# Patient Record
Sex: FEMALE | Race: WHITE | NOT HISPANIC OR LATINO | Employment: FULL TIME | URBAN - METROPOLITAN AREA
[De-identification: names, ages, dates, MRNs, and addresses within clinical notes are randomized per-mention and may not be internally consistent; named-entity substitution may affect disease eponyms.]

---

## 2017-01-19 ENCOUNTER — OFFICE VISIT (OUTPATIENT)
Dept: FAMILY MEDICINE | Facility: CLINIC | Age: 47
End: 2017-01-19
Payer: COMMERCIAL

## 2017-01-19 VITALS
DIASTOLIC BLOOD PRESSURE: 100 MMHG | TEMPERATURE: 99 F | HEART RATE: 90 BPM | SYSTOLIC BLOOD PRESSURE: 190 MMHG | OXYGEN SATURATION: 99 % | BODY MASS INDEX: 26.79 KG/M2 | WEIGHT: 180.88 LBS | HEIGHT: 69 IN

## 2017-01-19 DIAGNOSIS — J06.9 VIRAL URI WITH COUGH: Primary | ICD-10-CM

## 2017-01-19 DIAGNOSIS — R03.0 ELEVATED BLOOD PRESSURE READING: ICD-10-CM

## 2017-01-19 PROCEDURE — 99999 PR PBB SHADOW E&M-EST. PATIENT-LVL III: CPT | Mod: PBBFAC,,, | Performed by: NURSE PRACTITIONER

## 2017-01-19 PROCEDURE — 99214 OFFICE O/P EST MOD 30 MIN: CPT | Mod: S$GLB,,, | Performed by: NURSE PRACTITIONER

## 2017-01-19 PROCEDURE — 1159F MED LIST DOCD IN RCRD: CPT | Mod: S$GLB,,, | Performed by: NURSE PRACTITIONER

## 2017-01-19 NOTE — PATIENT INSTRUCTIONS
Stop OTC decongestants  Mucinex DM as needed for cough  Tylenol/Advil as needed for fever  Drink lots of fluids  Return next week to have nurse recheck blood pressure

## 2017-01-19 NOTE — LETTER
January 19, 2017      Paula Michael   [address] [place] [address]             [facility]  [address] [facility]  [place] [address]  Phone: [phone]  Fax: [phone] Paula Rodriguez    Was treated here on 01/19/2017    May Return to work/school on 1/23/2017                Garrison Hale, FNP-C

## 2017-01-19 NOTE — MR AVS SNAPSHOT
"    [facility]  [address] [place] [address]  Phone: [phone]  Fax: [phone]                  Paula Rodriguez   2017 9:40 AM   Office Visit    Description:  Female : 1970   Provider:  ANDRIY Steiner   Department:  [facility]           Reason for Visit     Fever     Cough           Diagnoses this Visit        Comments    Viral URI with cough    -  Primary     Elevated blood pressure reading                To Do List           Goals (5 Years of Data)     None      [facility]     [facility] Nurse Care Line -  Assistance  Registered nurses in the [facility] provide clinical advisement, health education, appointment booking, and other advisory services.  Call for this free service at [phone].             Medications           Message regarding Medications     Verify the changes and/or additions to your medication regime listed below are the same as discussed with your clinician today.  If any of these changes or additions are incorrect, please notify your healthcare provider.             Verify that the below list of medications is an accurate representation of the medications you are currently taking.  If none reported, the list may be blank. If incorrect, please contact your healthcare provider. Carry this list with you in case of emergency.           Current Medications     escitalopram oxalate (LEXAPRO) 20 MG tablet Take 1 tablet (20 mg total) by mouth once daily.    zolpidem (AMBIEN) 10 mg Tab Take 1 tablet (10 mg total) by mouth nightly as needed.    ciclopirox (PENLAC) 8 % Soln apply topically once daily           Clinical Reference Information           Vital Signs - Last Recorded  Most recent update: 2017  9:31 AM by Nichol Hamilton MA    BP Pulse Temp Ht Wt SpO2    (!) 190/100 90 98.5 °F (36.9 °C) (Oral) 5' 9" (1.753 m) 82.1 kg (180 lb 14.2 oz) 99%    BMI                26.71 kg/m2          Blood " Pressure          Most Recent Value    BP  (!)  190/100      Allergies as of 1/19/2017     No Known Allergies      Immunizations Administered on Date of Encounter - 1/19/2017     None      Instructions    Stop OTC decongestants  Mucinex DM as needed for cough  Tylenol/Advil as needed for fever  Drink lots of fluids  Return next week to have nurse recheck blood pressure

## 2017-01-19 NOTE — PROGRESS NOTES
Subjective:       Patient ID: Paula Rodriguez is a 46 y.o. female.    Chief Complaint: Fever and Cough    HPI Comments: 46-year-old female presents to the clinic today with complaint of fever, night sweats, sinus congestion, and a slight cough since Sunday.  She states her temperature has been 99.9 and 100.  She denies any sore throat, ear pain, wheezing, shortness of breath, abdominal pain, nausea, vomiting, or diarrhea.  She denies any headaches, dizziness, or blurred vision.  She denies any cardiac chest pain, heart palpitations, shortness of breath, or swelling to lower extremities.  She's been taking [place] cold and sinus.  Her blood pressure today is 190/100.  This could be related to the decongestants.  On 11/30/2016 her blood pressure was 121/70.     Past Medical History   Diagnosis Date    Anxiety disorder 9/6/2013    Insomnia 9/6/2013     History reviewed. No pertinent past surgical history.   reports that she has never smoked. She does not have any smokeless tobacco history on file.  Review of Systems   Constitutional: Positive for fever. Negative for chills.   HENT: Positive for congestion. Negative for ear discharge, ear pain, postnasal drip, rhinorrhea, sinus pressure, sneezing and sore throat.    Eyes: Negative for pain, discharge and itching.   Respiratory: Positive for cough. Negative for shortness of breath and wheezing.    Cardiovascular: Negative for chest pain, palpitations and leg swelling.   Gastrointestinal: Negative for abdominal pain, diarrhea, nausea and vomiting.   Musculoskeletal: Negative for gait problem.   Neurological: Negative for dizziness, light-headedness and headaches.       Objective:      Physical Exam   Constitutional: She is oriented to person, place, and time. She appears well-developed and well-nourished. No distress.   HENT:   Head: Normocephalic and atraumatic.   Right Ear: External ear normal.   Left Ear: External ear normal.   Nose: Nose normal.   Mouth/Throat:  Oropharynx is clear and moist. No oropharyngeal exudate.   Eyes: Conjunctivae and EOM are normal. Pupils are equal, round, and reactive to light. Right eye exhibits no discharge. Left eye exhibits no discharge. No scleral icterus.   Neck: Normal range of motion. Neck supple.   Cardiovascular: Normal rate, regular rhythm and normal heart sounds.  Exam reveals no gallop and no friction rub.    No murmur heard.  Pulmonary/Chest: Effort normal and breath sounds normal. No respiratory distress. She has no wheezes. She has no rales.   Abdominal: Soft. Bowel sounds are normal. There is no tenderness. There is no rebound and no guarding.   Musculoskeletal: Normal range of motion. She exhibits no edema.   Lymphadenopathy:     She has no cervical adenopathy.   Neurological: She is alert and oriented to person, place, and time.   Skin: Skin is warm and dry. She is not diaphoretic.   Psychiatric: She has a normal mood and affect.       Assessment:       1. Viral URI with cough    2. Elevated blood pressure reading        Plan:         Viral URI with cough  - Mucinex DM as directed    Elevated blood pressure reading  - stop Decongestants  - return next week for a blood pressure check up with the nurse     Tylenol/Advil as needed for fever  Rest   Drink lots of fluids

## 2017-01-25 ENCOUNTER — TELEPHONE (OUTPATIENT)
Dept: FAMILY MEDICINE | Facility: CLINIC | Age: 47
End: 2017-01-25

## 2017-01-25 NOTE — TELEPHONE ENCOUNTER
Call and recommend that she continue to monitor at home and then report how is doing to me over the computer

## 2017-01-25 NOTE — TELEPHONE ENCOUNTER
Patient walked in for blood pressure check  160/90. She is not on any medication for blood pressure. The home reading were not significant to warrant concern. Ranges for one week were 132/90  Never going above a high ranges. She also states she was taking a lot of over the counter medications for a cold.She states her blood pressure is usually high in the doctors office. Please advise.

## 2017-08-10 ENCOUNTER — OFFICE VISIT (OUTPATIENT)
Dept: FAMILY MEDICINE | Facility: CLINIC | Age: 47
End: 2017-08-10
Payer: COMMERCIAL

## 2017-08-10 ENCOUNTER — LAB VISIT (OUTPATIENT)
Dept: LAB | Facility: HOSPITAL | Age: 47
End: 2017-08-10
Attending: INTERNAL MEDICINE
Payer: COMMERCIAL

## 2017-08-10 VITALS
HEIGHT: 69 IN | TEMPERATURE: 98 F | BODY MASS INDEX: 25.83 KG/M2 | HEART RATE: 95 BPM | SYSTOLIC BLOOD PRESSURE: 162 MMHG | DIASTOLIC BLOOD PRESSURE: 100 MMHG | OXYGEN SATURATION: 99 % | WEIGHT: 174.38 LBS

## 2017-08-10 DIAGNOSIS — F41.9 ANXIETY DISORDER, UNSPECIFIED TYPE: ICD-10-CM

## 2017-08-10 DIAGNOSIS — Z00.00 ROUTINE MEDICAL EXAM: ICD-10-CM

## 2017-08-10 DIAGNOSIS — Z23 NEED FOR TDAP VACCINATION: ICD-10-CM

## 2017-08-10 DIAGNOSIS — Z00.00 ROUTINE MEDICAL EXAM: Primary | ICD-10-CM

## 2017-08-10 DIAGNOSIS — F41.9 ANXIETY: ICD-10-CM

## 2017-08-10 DIAGNOSIS — I10 ESSENTIAL HYPERTENSION: Primary | ICD-10-CM

## 2017-08-10 DIAGNOSIS — G47.00 INSOMNIA, UNSPECIFIED TYPE: ICD-10-CM

## 2017-08-10 DIAGNOSIS — I10 ESSENTIAL HYPERTENSION: ICD-10-CM

## 2017-08-10 LAB
25(OH)D3+25(OH)D2 SERPL-MCNC: 32 NG/ML
ALBUMIN SERPL BCP-MCNC: 4.3 G/DL
ALP SERPL-CCNC: 65 U/L
ALT SERPL W/O P-5'-P-CCNC: 31 U/L
ANION GAP SERPL CALC-SCNC: 11 MMOL/L
AST SERPL-CCNC: 29 U/L
BASOPHILS # BLD AUTO: 0.02 K/UL
BASOPHILS NFR BLD: 0.4 %
BILIRUB SERPL-MCNC: 0.3 MG/DL
BUN SERPL-MCNC: 13 MG/DL
CALCIUM SERPL-MCNC: 9.3 MG/DL
CHLORIDE SERPL-SCNC: 105 MMOL/L
CHOLEST/HDLC SERPL: 2.6 {RATIO}
CO2 SERPL-SCNC: 26 MMOL/L
CREAT SERPL-MCNC: 0.9 MG/DL
DIFFERENTIAL METHOD: ABNORMAL
EOSINOPHIL # BLD AUTO: 0.1 K/UL
EOSINOPHIL NFR BLD: 1.5 %
ERYTHROCYTE [DISTWIDTH] IN BLOOD BY AUTOMATED COUNT: 11.8 %
EST. GFR  (AFRICAN AMERICAN): >60 ML/MIN/1.73 M^2
EST. GFR  (NON AFRICAN AMERICAN): >60 ML/MIN/1.73 M^2
GLUCOSE SERPL-MCNC: 90 MG/DL
HCT VFR BLD AUTO: 41.8 %
HDL/CHOLESTEROL RATIO: 38.2 %
HDLC SERPL-MCNC: 191 MG/DL
HDLC SERPL-MCNC: 73 MG/DL
HGB BLD-MCNC: 14.3 G/DL
LDLC SERPL CALC-MCNC: 96.4 MG/DL
LYMPHOCYTES # BLD AUTO: 1.3 K/UL
LYMPHOCYTES NFR BLD: 24.9 %
MCH RBC QN AUTO: 31.9 PG
MCHC RBC AUTO-ENTMCNC: 34.2 G/DL
MCV RBC AUTO: 93 FL
MONOCYTES # BLD AUTO: 0.4 K/UL
MONOCYTES NFR BLD: 8 %
NEUTROPHILS # BLD AUTO: 3.5 K/UL
NEUTROPHILS NFR BLD: 65 %
NONHDLC SERPL-MCNC: 118 MG/DL
PLATELET # BLD AUTO: 237 K/UL
PMV BLD AUTO: 10.3 FL
POTASSIUM SERPL-SCNC: 4.3 MMOL/L
PROT SERPL-MCNC: 7.5 G/DL
RBC # BLD AUTO: 4.48 M/UL
SODIUM SERPL-SCNC: 142 MMOL/L
TRIGL SERPL-MCNC: 108 MG/DL
TSH SERPL DL<=0.005 MIU/L-ACNC: 1.87 UIU/ML
WBC # BLD AUTO: 5.38 K/UL

## 2017-08-10 PROCEDURE — 99396 PREV VISIT EST AGE 40-64: CPT | Mod: S$GLB,,, | Performed by: INTERNAL MEDICINE

## 2017-08-10 PROCEDURE — 85025 COMPLETE CBC W/AUTO DIFF WBC: CPT

## 2017-08-10 PROCEDURE — 84443 ASSAY THYROID STIM HORMONE: CPT

## 2017-08-10 PROCEDURE — 80053 COMPREHEN METABOLIC PANEL: CPT

## 2017-08-10 PROCEDURE — 36415 COLL VENOUS BLD VENIPUNCTURE: CPT | Mod: PO

## 2017-08-10 PROCEDURE — 82306 VITAMIN D 25 HYDROXY: CPT

## 2017-08-10 PROCEDURE — 80061 LIPID PANEL: CPT

## 2017-08-10 PROCEDURE — 99999 PR PBB SHADOW E&M-EST. PATIENT-LVL III: CPT | Mod: PBBFAC,,, | Performed by: INTERNAL MEDICINE

## 2017-08-10 RX ORDER — ZOLPIDEM TARTRATE 10 MG/1
10 TABLET ORAL NIGHTLY PRN
Qty: 30 TABLET | Refills: 5 | Status: SHIPPED | OUTPATIENT
Start: 2017-08-10 | End: 2018-08-02 | Stop reason: SDUPTHER

## 2017-08-10 RX ORDER — HYDROCHLOROTHIAZIDE 12.5 MG/1
12.5 TABLET ORAL DAILY
Qty: 90 TABLET | Refills: 11 | Status: SHIPPED | OUTPATIENT
Start: 2017-08-10 | End: 2017-08-11 | Stop reason: SDUPTHER

## 2017-08-10 NOTE — PROGRESS NOTES
Chief complaint physical exam    47-year-old white female still active and follows up with her GYN.  Last mammogram and GYN check was .  Blood pressures generally been running high.  She is exercising less.  She gained a little bit of weight.  Her job is more set area.  We discussed establishing a diagnosis of hypertension at this point.  We discussed low-dose HCTZ and expectations as well as dose adjustments and monitoring as well as the potential for hypotension when working out in the heat.  She would like a tetanus booster since she does work in the yard and also occasionally gets scratches and so forth associated with pets    CONST: weight stable. EYES: no vision change. ENT: no sore throat. CV: no chest pain w/ exertion. RESP: no shortness of breath. GI: no nausea, vomiting, diarrhea. No dysphagia. : no urinary issues. MUSCULOSKELETAL: no new myalgias or arthralgias. SKIN: no new changes. NEURO: no focal deficits. PSYCH: no new issues. ENDOCRINE: no polyuria. HEME: no lymph nodes. ALLERGY: no general pruritis.     PAST MEDICAL HISTORY:    1. Anxiety -Lexapro  2.  Hypertension formally diagnosed   3. [facility]   4.  Nail dermatophytosis                                                                                                                           PAST SURGICAL HISTORY:  None.                                                                                                                             SOCIAL HISTORY:  . Former smoker.  Quit in  and smoked for 10 years        prior.  She drinks alcohol on the weekends.                                                                                                               FAMILY HISTORY:  Mom  with no problems.  Father  last year at 76    of lung cancer and was a nonsmoker but was a book keeper in [place] [facility].    One sister in 40s in good health.        Gen: no distress  EYES: conjunctiva clear,  "non-icteric, PERRL  ENT: nose clear, nasal mucosa normal, oropharynx clear and moist, teeth good  NECK:supple, thyroid non-palpable  RESP: effort is good, lungs clear  CV: heart RRR w/o murmur, gallops or rubs; no carotid bruits, no edema  GI: abdomen soft, non-distended, non-tender, no hepatosplenomegaly  MS: gait normal, no clubbing or cyanosis of the digits  SKIN: no rashes, warm to touch            Paula was seen today for annual exam.    Diagnoses and all orders for this visit:    Routine medical exam, colonoscopy after 50, keep follow-up with GYN, routine lab work  -     Comprehensive metabolic panel; Future  -     CBC auto differential; Future  -     Lipid panel; Future  -     TSH; Future  -     Vitamin D; Future    Essential hypertension, new diagnosis, begin low-dose HCTZ 12.5    Anxiety disorder, unspecified type, continue Lexapro    Insomnia, unspecified type, occasional use of Ambien appropriate    Other orders  -     DIPH,PERTUSS(ACEL),TET VAC(PF)(ADULT)(ADACEL)(TDaP); Inject 0.5 mLs into the muscle once.  -     hydrochlorothiazide (HYDRODIURIL) 12.5 MG Tab; Take 1 tablet (12.5 mg total) by mouth once daily.  -     zolpidem (AMBIEN) 10 mg Tab; Take 1 tablet (10 mg total) by mouth nightly as needed.     Based on underlying anxiety issues, access would not be therapeutic"This note will not be shared with the patient."  "

## 2017-08-11 RX ORDER — ESCITALOPRAM OXALATE 20 MG/1
TABLET ORAL
Qty: 90 TABLET | Refills: 3 | Status: SHIPPED | OUTPATIENT
Start: 2017-08-11 | End: 2018-08-02 | Stop reason: SDUPTHER

## 2017-08-11 RX ORDER — HYDROCHLOROTHIAZIDE 12.5 MG/1
12.5 TABLET ORAL DAILY
Qty: 90 TABLET | Refills: 3 | Status: SHIPPED | OUTPATIENT
Start: 2017-08-11 | End: 2018-08-02 | Stop reason: SDUPTHER

## 2018-08-02 ENCOUNTER — OFFICE VISIT (OUTPATIENT)
Dept: FAMILY MEDICINE | Facility: CLINIC | Age: 48
End: 2018-08-02
Payer: COMMERCIAL

## 2018-08-02 ENCOUNTER — LAB VISIT (OUTPATIENT)
Dept: LAB | Facility: HOSPITAL | Age: 48
End: 2018-08-02
Payer: COMMERCIAL

## 2018-08-02 VITALS
OXYGEN SATURATION: 98 % | HEART RATE: 100 BPM | BODY MASS INDEX: 26.68 KG/M2 | WEIGHT: 180.13 LBS | DIASTOLIC BLOOD PRESSURE: 100 MMHG | HEIGHT: 69 IN | SYSTOLIC BLOOD PRESSURE: 142 MMHG | TEMPERATURE: 99 F

## 2018-08-02 DIAGNOSIS — Z00.00 ROUTINE MEDICAL EXAM: ICD-10-CM

## 2018-08-02 DIAGNOSIS — F41.9 ANXIETY: ICD-10-CM

## 2018-08-02 DIAGNOSIS — Z00.00 ROUTINE MEDICAL EXAM: Primary | ICD-10-CM

## 2018-08-02 DIAGNOSIS — I10 ESSENTIAL HYPERTENSION: ICD-10-CM

## 2018-08-02 LAB
ALBUMIN SERPL BCP-MCNC: 4.4 G/DL
ALP SERPL-CCNC: 67 U/L
ALT SERPL W/O P-5'-P-CCNC: 65 U/L
ANION GAP SERPL CALC-SCNC: 9 MMOL/L
AST SERPL-CCNC: 50 U/L
BASOPHILS # BLD AUTO: 0.04 K/UL
BASOPHILS NFR BLD: 0.8 %
BILIRUB SERPL-MCNC: 0.5 MG/DL
BUN SERPL-MCNC: 13 MG/DL
CALCIUM SERPL-MCNC: 9.8 MG/DL
CHLORIDE SERPL-SCNC: 103 MMOL/L
CHOLEST SERPL-MCNC: 202 MG/DL
CHOLEST/HDLC SERPL: 2.2 {RATIO}
CO2 SERPL-SCNC: 26 MMOL/L
CREAT SERPL-MCNC: 0.8 MG/DL
DIFFERENTIAL METHOD: ABNORMAL
EOSINOPHIL # BLD AUTO: 0.1 K/UL
EOSINOPHIL NFR BLD: 0.9 %
ERYTHROCYTE [DISTWIDTH] IN BLOOD BY AUTOMATED COUNT: 11.7 %
EST. GFR  (AFRICAN AMERICAN): >60 ML/MIN/1.73 M^2
EST. GFR  (NON AFRICAN AMERICAN): >60 ML/MIN/1.73 M^2
ESTIMATED AVG GLUCOSE: 88 MG/DL
GLUCOSE SERPL-MCNC: 100 MG/DL
HBA1C MFR BLD HPLC: 4.7 %
HCT VFR BLD AUTO: 44.1 %
HDLC SERPL-MCNC: 91 MG/DL
HDLC SERPL: 45 %
HGB BLD-MCNC: 14.5 G/DL
IMM GRANULOCYTES # BLD AUTO: 0.01 K/UL
IMM GRANULOCYTES NFR BLD AUTO: 0.2 %
LDLC SERPL CALC-MCNC: 101 MG/DL
LYMPHOCYTES # BLD AUTO: 1.3 K/UL
LYMPHOCYTES NFR BLD: 25 %
MCH RBC QN AUTO: 31.6 PG
MCHC RBC AUTO-ENTMCNC: 32.9 G/DL
MCV RBC AUTO: 96 FL
MONOCYTES # BLD AUTO: 0.4 K/UL
MONOCYTES NFR BLD: 7.7 %
NEUTROPHILS # BLD AUTO: 3.5 K/UL
NEUTROPHILS NFR BLD: 65.4 %
NONHDLC SERPL-MCNC: 111 MG/DL
NRBC BLD-RTO: 0 /100 WBC
PLATELET # BLD AUTO: 266 K/UL
PMV BLD AUTO: 10.6 FL
POTASSIUM SERPL-SCNC: 4.1 MMOL/L
PROT SERPL-MCNC: 7.8 G/DL
RBC # BLD AUTO: 4.59 M/UL
SODIUM SERPL-SCNC: 138 MMOL/L
TRIGL SERPL-MCNC: 50 MG/DL
TSH SERPL DL<=0.005 MIU/L-ACNC: 1.66 UIU/ML
WBC # BLD AUTO: 5.33 K/UL

## 2018-08-02 PROCEDURE — 83036 HEMOGLOBIN GLYCOSYLATED A1C: CPT

## 2018-08-02 PROCEDURE — 3080F DIAST BP >= 90 MM HG: CPT | Mod: CPTII,S$GLB,, | Performed by: NURSE PRACTITIONER

## 2018-08-02 PROCEDURE — 99999 PR PBB SHADOW E&M-EST. PATIENT-LVL III: CPT | Mod: PBBFAC,,, | Performed by: NURSE PRACTITIONER

## 2018-08-02 PROCEDURE — 36415 COLL VENOUS BLD VENIPUNCTURE: CPT | Mod: PO

## 2018-08-02 PROCEDURE — 85025 COMPLETE CBC W/AUTO DIFF WBC: CPT

## 2018-08-02 PROCEDURE — 3008F BODY MASS INDEX DOCD: CPT | Mod: CPTII,S$GLB,, | Performed by: NURSE PRACTITIONER

## 2018-08-02 PROCEDURE — 3077F SYST BP >= 140 MM HG: CPT | Mod: CPTII,S$GLB,, | Performed by: NURSE PRACTITIONER

## 2018-08-02 PROCEDURE — 80061 LIPID PANEL: CPT

## 2018-08-02 PROCEDURE — 99214 OFFICE O/P EST MOD 30 MIN: CPT | Mod: S$GLB,,, | Performed by: NURSE PRACTITIONER

## 2018-08-02 PROCEDURE — 84443 ASSAY THYROID STIM HORMONE: CPT

## 2018-08-02 PROCEDURE — 80053 COMPREHEN METABOLIC PANEL: CPT

## 2018-08-02 RX ORDER — ESCITALOPRAM OXALATE 20 MG/1
20 TABLET ORAL DAILY
Qty: 90 TABLET | Refills: 3 | Status: SHIPPED | OUTPATIENT
Start: 2018-08-02 | End: 2019-08-05 | Stop reason: SDUPTHER

## 2018-08-02 RX ORDER — HYDROCHLOROTHIAZIDE 12.5 MG/1
12.5 TABLET ORAL DAILY
Qty: 90 TABLET | Refills: 3 | Status: SHIPPED | OUTPATIENT
Start: 2018-08-02 | End: 2019-08-05 | Stop reason: SDUPTHER

## 2018-08-02 RX ORDER — ZOLPIDEM TARTRATE 10 MG/1
10 TABLET ORAL NIGHTLY PRN
Qty: 30 TABLET | Refills: 5 | Status: SHIPPED | OUTPATIENT
Start: 2018-08-02 | End: 2019-01-24 | Stop reason: SDUPTHER

## 2018-08-02 RX ORDER — LISINOPRIL 20 MG/1
20 TABLET ORAL DAILY
Qty: 90 TABLET | Refills: 3 | Status: SHIPPED | OUTPATIENT
Start: 2018-08-02 | End: 2019-07-24 | Stop reason: SDUPTHER

## 2018-08-02 NOTE — PROGRESS NOTES
This dictation has been generated using Dragon Dictation some phonetic errors may occur.     Problem List Items Addressed This Visit     Essential hypertension    Relevant Medications    hydroCHLOROthiazide (HYDRODIURIL) 12.5 MG Tab      Other Visit Diagnoses     Routine medical exam    -  Primary    Relevant Orders    CBC auto differential    Comprehensive metabolic panel    Hemoglobin A1c    Lipid panel    Urinalysis    TSH    Anxiety        Relevant Medications    escitalopram oxalate (LEXAPRO) 20 MG tablet        Orders Placed This Encounter    CBC auto differential    Comprehensive metabolic panel    Hemoglobin A1c    Lipid panel    Urinalysis    TSH    escitalopram oxalate (LEXAPRO) 20 MG tablet    zolpidem (AMBIEN) 10 mg Tab    lisinopril (PRINIVIL,ZESTRIL) 20 MG tablet    hydroCHLOROthiazide (HYDRODIURIL) 12.5 MG Tab     Routine medical examination update labs as above.  I will review and address accordingly.  Hypertension stable and controlled continue current therapy  Anxiety stable and controlled continue current therapy.    Follow-up if symptoms worsen or fail to improve.    ________________________________________________________________  ________________________________________________________________      Chief Complaint   Patient presents with    Hypertension    Medication Refill     History of present illness  This 48 y.o. presents today for complaint of physical exam with complaint of elevated blood pressure.  Patient remains active.  She does work in the [employer].  She goes to the gym 2 days a week with her .  She is new to me but follows [employer].  Her  is known to me.  She does watch her diet.  She watches her sodium intake.  She does not drink sodas and does not eat out often.  She does note some recent elevations of her blood pressure.  She follows at [facility] and is overdue for exam last date being about a year and half ago.  Mammogram will be due.  She  states intend to follow up with [facility].  She states she gets her flu shot at work.  She works for the [employer] [place].  Plans to get her shot at work and about October.  ROS:   CONST: weight stable.  EYES: no vision change.  ENT: No sore throat, headache, or earache.  CV: no chest pain   RESP: No shortness of breath.  GI: no nausea, vomiting, diarrhea.   Appetite good.   : no urinary issues excellent going to the bathroom once at night.  MUSCULOSKELETAL: no new myalgias or arthralgias.  SKIN: no new changes.  NEURO: no focal deficits. Denies headache.  PSYCH: no new issues.  ENDOCRINE: no polyuria.  No polydipsia.  HEME: no lymph nodes.  ALLERGY: no general pruritis.       Past Medical History:   Diagnosis Date    Anxiety disorder 9/6/2013    Insomnia 9/6/2013       History reviewed. No pertinent surgical history.    Family History   Problem Relation Age of Onset    Ovarian cancer Maternal Aunt     Breast cancer Neg Hx     Colon cancer Neg Hx        Social History     Social History    Marital status:      Spouse name: N/A    Number of children: N/A    Years of education: N/A     Social History Main Topics    Smoking status: Never Smoker    Smokeless tobacco: None    Alcohol use None    Drug use: Unknown    Sexual activity: Not Asked     Other Topics Concern    None     Social History Narrative    None       Current Outpatient Prescriptions   Medication Sig Dispense Refill    escitalopram oxalate (LEXAPRO) 20 MG tablet Take 1 tablet (20 mg total) by mouth once daily. 90 tablet 3    hydroCHLOROthiazide (HYDRODIURIL) 12.5 MG Tab Take 1 tablet (12.5 mg total) by mouth once daily. 90 tablet 3    zolpidem (AMBIEN) 10 mg Tab Take 1 tablet (10 mg total) by mouth nightly as needed. 30 tablet 5    lisinopril (PRINIVIL,ZESTRIL) 20 MG tablet Take 1 tablet (20 mg total) by mouth once daily. 90 tablet 3     No current facility-administered medications for this visit.         Review of patient's allergies indicates:  No Known Allergies    Physical examination  Vitals Reviewed  Gen. Well-dressed well-nourished   Skin warm dry and intact.  No rashes noted.  Neck is supple without adenopathy  Chest.  Respirations are even unlabored.  Lungs are clear to auscultation.  Cardiac regular rate and rhythm.  No chest wall adenopathy noted.  Abdomen is soft and not distended.  Bowel sounds are present.  No tenderness during palpation of the abdomen.  No Hepatosplenomegaly noted.  No hernia noted.  No CVA tenderness to percussion.    Neuro. Awake alert oriented x4.  Normal judgment and cognition noted.  Extremities no clubbing cyanosis or edema noted.     Call or return to clinic prn if these symptoms worsen or fail to improve as anticipated.

## 2018-08-03 ENCOUNTER — PATIENT MESSAGE (OUTPATIENT)
Dept: FAMILY MEDICINE | Facility: CLINIC | Age: 48
End: 2018-08-03

## 2018-08-03 DIAGNOSIS — R79.89 ELEVATED LFTS: Primary | ICD-10-CM

## 2018-08-17 ENCOUNTER — LAB VISIT (OUTPATIENT)
Dept: LAB | Facility: HOSPITAL | Age: 48
End: 2018-08-17
Attending: NURSE PRACTITIONER
Payer: COMMERCIAL

## 2018-08-17 ENCOUNTER — PATIENT MESSAGE (OUTPATIENT)
Dept: FAMILY MEDICINE | Facility: CLINIC | Age: 48
End: 2018-08-17

## 2018-08-17 ENCOUNTER — CLINICAL SUPPORT (OUTPATIENT)
Dept: FAMILY MEDICINE | Facility: CLINIC | Age: 48
End: 2018-08-17
Payer: COMMERCIAL

## 2018-08-17 VITALS — DIASTOLIC BLOOD PRESSURE: 88 MMHG | SYSTOLIC BLOOD PRESSURE: 140 MMHG | OXYGEN SATURATION: 99 % | HEART RATE: 81 BPM

## 2018-08-17 DIAGNOSIS — R79.89 ELEVATED LFTS: Primary | ICD-10-CM

## 2018-08-17 DIAGNOSIS — R79.89 ELEVATED LFTS: ICD-10-CM

## 2018-08-17 DIAGNOSIS — I10 ESSENTIAL HYPERTENSION: Primary | ICD-10-CM

## 2018-08-17 LAB
ALBUMIN SERPL BCP-MCNC: 4.4 G/DL
ALP SERPL-CCNC: 65 U/L
ALT SERPL W/O P-5'-P-CCNC: 92 U/L
AST SERPL-CCNC: 45 U/L
BILIRUB DIRECT SERPL-MCNC: 0.3 MG/DL
BILIRUB SERPL-MCNC: 0.7 MG/DL
PROT SERPL-MCNC: 7.3 G/DL

## 2018-08-17 PROCEDURE — 99499 UNLISTED E&M SERVICE: CPT | Mod: S$GLB,,, | Performed by: INTERNAL MEDICINE

## 2018-08-17 PROCEDURE — 36415 COLL VENOUS BLD VENIPUNCTURE: CPT | Mod: PO

## 2018-08-17 PROCEDURE — 99999 PR PBB SHADOW E&M-EST. PATIENT-LVL II: CPT | Mod: PBBFAC,,,

## 2018-08-17 PROCEDURE — 80076 HEPATIC FUNCTION PANEL: CPT

## 2018-08-17 NOTE — PROGRESS NOTES
Paula Rodriguez 48 y.o. female is here today for Blood Pressure check.   History of HTN yes.    Review of patient's allergies indicates:  No Known Allergies  Creatinine   Date Value Ref Range Status   08/02/2018 0.8 0.5 - 1.4 mg/dL Final     Sodium   Date Value Ref Range Status   08/02/2018 138 136 - 145 mmol/L Final     Potassium   Date Value Ref Range Status   08/02/2018 4.1 3.5 - 5.1 mmol/L Final   ]  Patient verifies taking blood pressure medications on a regular basis at the same time of the day.     Current Outpatient Medications:     escitalopram oxalate (LEXAPRO) 20 MG tablet, Take 1 tablet (20 mg total) by mouth once daily., Disp: 90 tablet, Rfl: 3    hydroCHLOROthiazide (HYDRODIURIL) 12.5 MG Tab, Take 1 tablet (12.5 mg total) by mouth once daily., Disp: 90 tablet, Rfl: 3    lisinopril (PRINIVIL,ZESTRIL) 20 MG tablet, Take 1 tablet (20 mg total) by mouth once daily., Disp: 90 tablet, Rfl: 3    zolpidem (AMBIEN) 10 mg Tab, Take 1 tablet (10 mg total) by mouth nightly as needed., Disp: 30 tablet, Rfl: 5  Does patient have record of home blood pressure readings yes.  Reports home readings as 120's/70's  Last dose of blood pressure medication was taken at approx 8 am yesterday. Patient did not take medication this morning.   Patient is asymptomatic.   Complains of none.    Vitals:    08/17/18 0857   BP: (!) 140/88   BP Location: Left arm   Patient Position: Sitting   BP Method: Medium (Manual)   Pulse: 81   SpO2: 99%         Dr. Matos notified.

## 2018-08-19 ENCOUNTER — PATIENT MESSAGE (OUTPATIENT)
Dept: FAMILY MEDICINE | Facility: CLINIC | Age: 48
End: 2018-08-19

## 2018-08-19 DIAGNOSIS — R79.89 ELEVATED LFTS: Primary | ICD-10-CM

## 2018-09-10 ENCOUNTER — HOSPITAL ENCOUNTER (OUTPATIENT)
Dept: RADIOLOGY | Facility: HOSPITAL | Age: 48
Discharge: HOME | End: 2018-09-10
Attending: NURSE PRACTITIONER
Payer: COMMERCIAL

## 2018-09-10 DIAGNOSIS — R79.89 ELEVATED LFTS: ICD-10-CM

## 2018-09-10 PROCEDURE — 76700 US EXAM ABDOM COMPLETE: CPT | Mod: TC

## 2018-09-10 PROCEDURE — 76700 US EXAM ABDOM COMPLETE: CPT | Mod: 26,,, | Performed by: RADIOLOGY

## 2018-11-27 ENCOUNTER — PATIENT MESSAGE (OUTPATIENT)
Dept: FAMILY MEDICINE | Facility: CLINIC | Age: 48
End: 2018-11-27

## 2018-11-28 NOTE — TELEPHONE ENCOUNTER
Yes, please call for prior authorization.  This 1 probably should go through and I believe we have done it before

## 2019-01-24 ENCOUNTER — PATIENT MESSAGE (OUTPATIENT)
Dept: FAMILY MEDICINE | Facility: CLINIC | Age: 49
End: 2019-01-24

## 2019-01-25 ENCOUNTER — PATIENT MESSAGE (OUTPATIENT)
Dept: FAMILY MEDICINE | Facility: CLINIC | Age: 49
End: 2019-01-25

## 2019-01-25 RX ORDER — ZOLPIDEM TARTRATE 10 MG/1
10 TABLET ORAL NIGHTLY PRN
Qty: 30 TABLET | Refills: 5 | Status: SHIPPED | OUTPATIENT
Start: 2019-01-25 | End: 2019-08-05 | Stop reason: SDUPTHER

## 2019-01-31 ENCOUNTER — PATIENT MESSAGE (OUTPATIENT)
Dept: FAMILY MEDICINE | Facility: CLINIC | Age: 49
End: 2019-01-31

## 2019-05-15 ENCOUNTER — TELEPHONE (OUTPATIENT)
Dept: ADMINISTRATIVE | Facility: HOSPITAL | Age: 49
End: 2019-05-15

## 2019-05-17 DIAGNOSIS — Z12.11 COLON CANCER SCREENING: ICD-10-CM

## 2019-06-18 ENCOUNTER — PATIENT MESSAGE (OUTPATIENT)
Dept: ADMINISTRATIVE | Facility: HOSPITAL | Age: 49
End: 2019-06-18

## 2019-06-18 ENCOUNTER — DOCUMENTATION ONLY (OUTPATIENT)
Dept: ADMINISTRATIVE | Facility: HOSPITAL | Age: 49
End: 2019-06-18

## 2019-07-16 ENCOUNTER — LAB VISIT (OUTPATIENT)
Dept: LAB | Facility: HOSPITAL | Age: 49
End: 2019-07-16
Attending: INTERNAL MEDICINE
Payer: COMMERCIAL

## 2019-07-16 DIAGNOSIS — Z12.11 COLON CANCER SCREENING: ICD-10-CM

## 2019-07-16 PROCEDURE — 82274 ASSAY TEST FOR BLOOD FECAL: CPT

## 2019-07-18 LAB — HEMOCCULT STL QL IA: NEGATIVE

## 2019-07-24 ENCOUNTER — LAB VISIT (OUTPATIENT)
Dept: LAB | Facility: HOSPITAL | Age: 49
End: 2019-07-24
Payer: COMMERCIAL

## 2019-07-24 ENCOUNTER — OFFICE VISIT (OUTPATIENT)
Dept: FAMILY MEDICINE | Facility: CLINIC | Age: 49
End: 2019-07-24
Payer: COMMERCIAL

## 2019-07-24 VITALS
WEIGHT: 170.88 LBS | SYSTOLIC BLOOD PRESSURE: 140 MMHG | DIASTOLIC BLOOD PRESSURE: 84 MMHG | HEART RATE: 97 BPM | BODY MASS INDEX: 25.31 KG/M2 | OXYGEN SATURATION: 98 % | HEIGHT: 69 IN | TEMPERATURE: 98 F

## 2019-07-24 DIAGNOSIS — Z00.00 ROUTINE PHYSICAL EXAMINATION: Primary | ICD-10-CM

## 2019-07-24 DIAGNOSIS — G56.01 CARPAL TUNNEL SYNDROME OF RIGHT WRIST: ICD-10-CM

## 2019-07-24 DIAGNOSIS — Z00.00 ROUTINE PHYSICAL EXAMINATION: ICD-10-CM

## 2019-07-24 DIAGNOSIS — G47.00 INSOMNIA, UNSPECIFIED TYPE: ICD-10-CM

## 2019-07-24 DIAGNOSIS — L81.9 DISCOLORATION OF SKIN: ICD-10-CM

## 2019-07-24 DIAGNOSIS — Z23 IMMUNIZATION DUE: ICD-10-CM

## 2019-07-24 DIAGNOSIS — I10 ESSENTIAL HYPERTENSION: ICD-10-CM

## 2019-07-24 DIAGNOSIS — F41.9 ANXIETY DISORDER, UNSPECIFIED TYPE: ICD-10-CM

## 2019-07-24 DIAGNOSIS — Z23 NEED FOR MMR VACCINE: ICD-10-CM

## 2019-07-24 LAB
ALBUMIN SERPL BCP-MCNC: 4.5 G/DL (ref 3.5–5.2)
ALP SERPL-CCNC: 71 U/L (ref 55–135)
ALT SERPL W/O P-5'-P-CCNC: 27 U/L (ref 10–44)
ANION GAP SERPL CALC-SCNC: 9 MMOL/L (ref 8–16)
AST SERPL-CCNC: 27 U/L (ref 10–40)
BASOPHILS # BLD AUTO: 0.03 K/UL (ref 0–0.2)
BASOPHILS NFR BLD: 0.6 % (ref 0–1.9)
BILIRUB SERPL-MCNC: 0.5 MG/DL (ref 0.1–1)
BUN SERPL-MCNC: 15 MG/DL (ref 6–20)
CALCIUM SERPL-MCNC: 10.4 MG/DL (ref 8.7–10.5)
CHLORIDE SERPL-SCNC: 100 MMOL/L (ref 95–110)
CHOLEST SERPL-MCNC: 211 MG/DL (ref 120–199)
CHOLEST/HDLC SERPL: 2.2 {RATIO} (ref 2–5)
CO2 SERPL-SCNC: 29 MMOL/L (ref 23–29)
CREAT SERPL-MCNC: 0.9 MG/DL (ref 0.5–1.4)
DIFFERENTIAL METHOD: ABNORMAL
EOSINOPHIL # BLD AUTO: 0.1 K/UL (ref 0–0.5)
EOSINOPHIL NFR BLD: 1 % (ref 0–8)
ERYTHROCYTE [DISTWIDTH] IN BLOOD BY AUTOMATED COUNT: 11.7 % (ref 11.5–14.5)
EST. GFR  (AFRICAN AMERICAN): >60 ML/MIN/1.73 M^2
EST. GFR  (NON AFRICAN AMERICAN): >60 ML/MIN/1.73 M^2
GLUCOSE SERPL-MCNC: 98 MG/DL (ref 70–110)
HCT VFR BLD AUTO: 43.7 % (ref 37–48.5)
HDLC SERPL-MCNC: 95 MG/DL (ref 40–75)
HDLC SERPL: 45 % (ref 20–50)
HGB BLD-MCNC: 14 G/DL (ref 12–16)
IMM GRANULOCYTES # BLD AUTO: 0.01 K/UL (ref 0–0.04)
IMM GRANULOCYTES NFR BLD AUTO: 0.2 % (ref 0–0.5)
LDLC SERPL CALC-MCNC: 101 MG/DL (ref 63–159)
LYMPHOCYTES # BLD AUTO: 1.4 K/UL (ref 1–4.8)
LYMPHOCYTES NFR BLD: 28.2 % (ref 18–48)
MCH RBC QN AUTO: 31.4 PG (ref 27–31)
MCHC RBC AUTO-ENTMCNC: 32 G/DL (ref 32–36)
MCV RBC AUTO: 98 FL (ref 82–98)
MONOCYTES # BLD AUTO: 0.4 K/UL (ref 0.3–1)
MONOCYTES NFR BLD: 8.9 % (ref 4–15)
NEUTROPHILS # BLD AUTO: 2.9 K/UL (ref 1.8–7.7)
NEUTROPHILS NFR BLD: 61.1 % (ref 38–73)
NONHDLC SERPL-MCNC: 116 MG/DL
NRBC BLD-RTO: 0 /100 WBC
PLATELET # BLD AUTO: 245 K/UL (ref 150–350)
PMV BLD AUTO: 11 FL (ref 9.2–12.9)
POTASSIUM SERPL-SCNC: 4.2 MMOL/L (ref 3.5–5.1)
PROT SERPL-MCNC: 7.4 G/DL (ref 6–8.4)
RBC # BLD AUTO: 4.46 M/UL (ref 4–5.4)
SODIUM SERPL-SCNC: 138 MMOL/L (ref 136–145)
TRIGL SERPL-MCNC: 75 MG/DL (ref 30–150)
TSH SERPL DL<=0.005 MIU/L-ACNC: 1.8 UIU/ML (ref 0.4–4)
WBC # BLD AUTO: 4.82 K/UL (ref 3.9–12.7)

## 2019-07-24 PROCEDURE — 99999 PR PBB SHADOW E&M-EST. PATIENT-LVL V: CPT | Mod: PBBFAC,,, | Performed by: NURSE PRACTITIONER

## 2019-07-24 PROCEDURE — 84443 ASSAY THYROID STIM HORMONE: CPT

## 2019-07-24 PROCEDURE — 85025 COMPLETE CBC W/AUTO DIFF WBC: CPT

## 2019-07-24 PROCEDURE — 99396 PR PREVENTIVE VISIT,EST,40-64: ICD-10-PCS | Mod: 25,S$GLB,, | Performed by: NURSE PRACTITIONER

## 2019-07-24 PROCEDURE — 80061 LIPID PANEL: CPT

## 2019-07-24 PROCEDURE — 80053 COMPREHEN METABOLIC PANEL: CPT

## 2019-07-24 PROCEDURE — 90471 MMR VACCINE SQ: ICD-10-PCS | Mod: S$GLB,,, | Performed by: NURSE PRACTITIONER

## 2019-07-24 PROCEDURE — 99396 PREV VISIT EST AGE 40-64: CPT | Mod: 25,S$GLB,, | Performed by: NURSE PRACTITIONER

## 2019-07-24 PROCEDURE — 3077F PR MOST RECENT SYSTOLIC BLOOD PRESSURE >= 140 MM HG: ICD-10-PCS | Mod: CPTII,S$GLB,, | Performed by: NURSE PRACTITIONER

## 2019-07-24 PROCEDURE — 99999 PR PBB SHADOW E&M-EST. PATIENT-LVL V: ICD-10-PCS | Mod: PBBFAC,,, | Performed by: NURSE PRACTITIONER

## 2019-07-24 PROCEDURE — 90707 MMR VACCINE SQ: ICD-10-PCS | Mod: S$GLB,,, | Performed by: NURSE PRACTITIONER

## 2019-07-24 PROCEDURE — 3079F PR MOST RECENT DIASTOLIC BLOOD PRESSURE 80-89 MM HG: ICD-10-PCS | Mod: CPTII,S$GLB,, | Performed by: NURSE PRACTITIONER

## 2019-07-24 PROCEDURE — 3077F SYST BP >= 140 MM HG: CPT | Mod: CPTII,S$GLB,, | Performed by: NURSE PRACTITIONER

## 2019-07-24 PROCEDURE — 90471 IMMUNIZATION ADMIN: CPT | Mod: S$GLB,,, | Performed by: NURSE PRACTITIONER

## 2019-07-24 PROCEDURE — 90707 MMR VACCINE SC: CPT | Mod: S$GLB,,, | Performed by: NURSE PRACTITIONER

## 2019-07-24 PROCEDURE — 3079F DIAST BP 80-89 MM HG: CPT | Mod: CPTII,S$GLB,, | Performed by: NURSE PRACTITIONER

## 2019-07-24 PROCEDURE — 36415 COLL VENOUS BLD VENIPUNCTURE: CPT | Mod: PO

## 2019-07-24 RX ORDER — LISINOPRIL 20 MG/1
20 TABLET ORAL DAILY
Qty: 90 TABLET | Refills: 3 | Status: SHIPPED | OUTPATIENT
Start: 2019-07-24 | End: 2019-08-05

## 2019-07-24 NOTE — PATIENT INSTRUCTIONS
Check fasting labs  Call and schedule a appointment with GYN for pap smear and mammogram with Dr. Valiente  Continue all current medications  Follow up with Dr. Matos 8/5/2019 as scheduled

## 2019-07-24 NOTE — PROGRESS NOTES
Subjective:       Patient ID: Paula Rodriguez is a 49 y.o. female.    Chief Complaint: Annual Exam and Medication Refill    49-year-old female presents to the clinic today for annual physical.  She states she has good dietary habits.  She walks her dog 30 minutes every morning and does a lot a yard work.  She is compliant with all of her medications.  She denies any headaches, dizziness, or blurred vision.  She denies any cardiac chest pain, heart palpitations, shortness breath, or swelling to lower extremities.  She states her home blood pressures run in the 120s over 80s.  She states that she has discoloration of her skin to both forearms and face and requests a referral of Dermatology.  She is having problems with her carpal tunnel on her right wrist.  She has been wearing a carpal tunnel splint but is still having a lot of pain and numbness and tingling in her fingers.  She would like to see a specialist. She request a MMR injection [place].     Past Medical History:   Diagnosis Date    Anxiety disorder 9/6/2013    Hypertension     Insomnia 9/6/2013     History reviewed. No pertinent surgical history.   reports that she has never smoked. She does not have any smokeless tobacco history on file.  Review of Systems   Constitutional: Negative for activity change and unexpected weight change.   HENT: Negative for hearing loss, rhinorrhea and trouble swallowing.    Eyes: Negative for discharge and visual disturbance.   Respiratory: Negative for chest tightness and wheezing.    Cardiovascular: Negative for chest pain and palpitations.   Gastrointestinal: Negative for blood in stool, constipation, diarrhea and vomiting.   Endocrine: Negative for polydipsia and polyuria.   Genitourinary: Negative for difficulty urinating, dysuria, hematuria and menstrual problem.   Musculoskeletal: Positive for arthralgias. Negative for joint swelling and neck pain.        Right wrist pain    Skin:  Positive for color change.   Neurological: Negative for weakness and headaches.        Numbness and tingling to right fingers    Psychiatric/Behavioral: Negative for confusion and dysphoric mood.       Objective:      Physical Exam   Constitutional: She is oriented to person, place, and time. She appears well-developed and well-nourished. No distress.   HENT:   Head: Normocephalic and atraumatic.   Right Ear: External ear normal.   Left Ear: External ear normal.   Nose: Nose normal.   Mouth/Throat: Oropharynx is clear and moist.   Eyes: Pupils are equal, round, and reactive to light. Conjunctivae and EOM are normal. Right eye exhibits no discharge. Left eye exhibits no discharge. No scleral icterus.   Neck: Normal range of motion. Neck supple. No JVD present. No thyromegaly present.   Cardiovascular: Normal rate, regular rhythm and normal heart sounds. Exam reveals no friction rub.   No murmur heard.  Pulmonary/Chest: Effort normal and breath sounds normal. No respiratory distress. She has no wheezes. She has no rales.   Abdominal: Soft. Bowel sounds are normal. There is no tenderness. There is no rebound and no guarding.   Musculoskeletal: Normal range of motion. She exhibits no edema.   Lymphadenopathy:     She has no cervical adenopathy.   Neurological: She is alert and oriented to person, place, and time. She displays normal reflexes.   Skin: Skin is warm and dry. No rash noted. She is not diaphoretic.   Brownish discoloration noted to both forearms and face    Psychiatric: She has a normal mood and affect. Her behavior is normal. Judgment and thought content normal.       Assessment:       1. Routine physical examination    2. Essential hypertension    3. Anxiety disorder, unspecified type    4. Insomnia, unspecified type    5. Immunization due    6. Discoloration of skin    7. Carpal tunnel syndrome of right wrist        Plan:         Routine physical examination  -     CBC auto differential; Future; Expected  date: 07/24/2019  -     Comprehensive metabolic panel; Future; Expected date: 07/24/2019  -     Lipid panel; Future; Expected date: 07/24/2019  -     TSH; Future; Expected date: 07/24/2019    Essential hypertension  -     lisinopril (PRINIVIL,ZESTRIL) 20 MG tablet; Take 1 tablet (20 mg total) by mouth once daily.  Dispense: 90 tablet; Refill: 3  - continue current blood pressure medication    Anxiety disorder, unspecified type  - The current medical regimen is effective;  continue present plan and medications.    Insomnia, unspecified type  - The current medical regimen is effective;  continue present plan and medications.    Immunization due  -     Cancel: (In Office Administered) MMR / Varicella Combined Vaccine (SQ)  -     (In Office Administered) MMR Vaccine (SQ)    Discoloration of skin  -     Ambulatory consult to Dermatology    Carpal tunnel syndrome of right wrist  -     Ambulatory referral to Orthopedics    Need for MMR vaccine

## 2019-08-05 ENCOUNTER — OFFICE VISIT (OUTPATIENT)
Dept: FAMILY MEDICINE | Facility: CLINIC | Age: 49
End: 2019-08-05
Payer: COMMERCIAL

## 2019-08-05 VITALS
DIASTOLIC BLOOD PRESSURE: 82 MMHG | SYSTOLIC BLOOD PRESSURE: 122 MMHG | HEIGHT: 69 IN | HEART RATE: 92 BPM | TEMPERATURE: 99 F | WEIGHT: 171.06 LBS | OXYGEN SATURATION: 98 % | BODY MASS INDEX: 25.34 KG/M2

## 2019-08-05 DIAGNOSIS — F41.9 ANXIETY: ICD-10-CM

## 2019-08-05 DIAGNOSIS — Z12.12 SCREENING FOR COLORECTAL CANCER: ICD-10-CM

## 2019-08-05 DIAGNOSIS — G47.00 INSOMNIA, UNSPECIFIED TYPE: ICD-10-CM

## 2019-08-05 DIAGNOSIS — F41.9 ANXIETY DISORDER, UNSPECIFIED TYPE: ICD-10-CM

## 2019-08-05 DIAGNOSIS — Z12.11 SCREENING FOR COLORECTAL CANCER: ICD-10-CM

## 2019-08-05 DIAGNOSIS — Z00.00 ROUTINE MEDICAL EXAM: Primary | ICD-10-CM

## 2019-08-05 DIAGNOSIS — I10 ESSENTIAL HYPERTENSION: ICD-10-CM

## 2019-08-05 PROCEDURE — 99999 PR PBB SHADOW E&M-EST. PATIENT-LVL III: ICD-10-PCS | Mod: PBBFAC,,, | Performed by: INTERNAL MEDICINE

## 2019-08-05 PROCEDURE — 3079F PR MOST RECENT DIASTOLIC BLOOD PRESSURE 80-89 MM HG: ICD-10-PCS | Mod: CPTII,S$GLB,, | Performed by: INTERNAL MEDICINE

## 2019-08-05 PROCEDURE — 3079F DIAST BP 80-89 MM HG: CPT | Mod: CPTII,S$GLB,, | Performed by: INTERNAL MEDICINE

## 2019-08-05 PROCEDURE — 99396 PR PREVENTIVE VISIT,EST,40-64: ICD-10-PCS | Mod: S$GLB,,, | Performed by: INTERNAL MEDICINE

## 2019-08-05 PROCEDURE — 3074F PR MOST RECENT SYSTOLIC BLOOD PRESSURE < 130 MM HG: ICD-10-PCS | Mod: CPTII,S$GLB,, | Performed by: INTERNAL MEDICINE

## 2019-08-05 PROCEDURE — 99396 PREV VISIT EST AGE 40-64: CPT | Mod: S$GLB,,, | Performed by: INTERNAL MEDICINE

## 2019-08-05 PROCEDURE — 3074F SYST BP LT 130 MM HG: CPT | Mod: CPTII,S$GLB,, | Performed by: INTERNAL MEDICINE

## 2019-08-05 PROCEDURE — 99999 PR PBB SHADOW E&M-EST. PATIENT-LVL III: CPT | Mod: PBBFAC,,, | Performed by: INTERNAL MEDICINE

## 2019-08-05 RX ORDER — ESCITALOPRAM OXALATE 20 MG/1
20 TABLET ORAL DAILY
Qty: 90 TABLET | Refills: 3 | Status: SHIPPED | OUTPATIENT
Start: 2019-08-05 | End: 2020-07-21 | Stop reason: SDUPTHER

## 2019-08-05 RX ORDER — HYDROCHLOROTHIAZIDE 12.5 MG/1
12.5 TABLET ORAL DAILY
Qty: 90 TABLET | Refills: 3 | Status: SHIPPED | OUTPATIENT
Start: 2019-08-05 | End: 2020-07-21 | Stop reason: SDUPTHER

## 2019-08-05 RX ORDER — ZOLPIDEM TARTRATE 10 MG/1
10 TABLET ORAL NIGHTLY PRN
Qty: 30 TABLET | Refills: 5 | Status: SHIPPED | OUTPATIENT
Start: 2019-08-05 | End: 2020-07-21 | Stop reason: SDUPTHER

## 2019-08-05 RX ORDER — VALSARTAN 80 MG/1
80 TABLET ORAL DAILY
Qty: 90 TABLET | Refills: 3 | Status: SHIPPED | OUTPATIENT
Start: 2019-08-05 | End: 2019-09-10 | Stop reason: SDUPTHER

## 2019-08-05 NOTE — PROGRESS NOTES
Chief complaint physical exam    49-year-old white female still active and follows up with her GYN.  Last mammogram and GYN check was 9/18.  Blood pressures were generally  running high.  She is exercising less.  She gained a little bit of weight.  Her job is more sedentary.  We discussed establishing a diagnosis of hypertension in 2017.  We discussed low-dose HCTZ and expectations as well as dose adjustments and monitoring as well as the potential for hypotension when working out in the heat.  stool test sent to her is neg.  Discussed the importance of colonoscopy and she is willing.  She would rather have it done at the [facility] where her  has had a few colonoscopies.    Blood pressure home runs normal in the 120s but we discussed issues related to lisinopril her father did have non smoking related lung cancer.  She is open to switching to valsartan 80 and she will monitor so that we can adjust the correct dose.  We also discussed long-term increased risk of dementia possibly associated with Ambien.  She does not take it that often but she is now aware of the potential risk but it is effective for.    CONST: weight stable. EYES: no vision change. ENT: no sore throat. CV: no chest pain w/ exertion. RESP: no shortness of breath. GI: no nausea, vomiting, diarrhea. No dysphagia. : no urinary issues. MUSCULOSKELETAL: no new myalgias or arthralgias. SKIN: no new changes. NEURO: no focal deficits. PSYCH: no new issues. ENDOCRINE: no polyuria. HEME: no lymph nodes. ALLERGY: no general pruritis.     PAST MEDICAL HISTORY:    1. Anxiety/insomnia -Lexapro, occasional use of Ambien  2.  Hypertension formally diagnosed 2017  3. [facility].   4.  Nail dermatophytosis                                                                                                                           PAST SURGICAL HISTORY:  None.                                                                                 Bolus PEG feeding administered as ordered. Pt tolerating well.                                              SOCIAL HISTORY:  . Former smoker.  Quit in  and smoked for 10 years        prior.  She drinks alcohol on the weekends.                                                                                                               FAMILY HISTORY:  Mom  with no problems.  Father  last year at 76    of lung cancer and was a nonsmoker but was a book keeper in [place].    One sister in 40s in good health.        Gen: no distress  EYES: conjunctiva clear, non-icteric, PERRL  ENT: nose clear, nasal mucosa normal, oropharynx clear and moist, teeth good  NECK:supple, thyroid non-palpable  RESP: effort is good, lungs clear  CV: heart RRR w/o murmur, gallops or rubs; no carotid bruits, no edema  GI: abdomen soft, non-distended, non-tender, no hepatosplenomegaly  MS: gait normal, no clubbing or cyanosis of the digits  SKIN: no rashes, warm to touch            Paula was seen today for annual exam.    Diagnoses and all orders for this visit:    Routine medical exam, she keeps up with gyn as mammogram scheduled, she is willing to pursue colonoscopy, labs all reviewed and unremarkable.  Her previous ALT elevation has resolved.    Screening for colorectal cancer  -     Case request GI: COLONOSCOPY    Essential hypertension, discontinue ACE-inhibitor and switched to valsartan 80  -     hydroCHLOROthiazide (HYDRODIURIL) 12.5 MG Tab; Take 1 tablet (12.5 mg total) by mouth once daily.    Anxiety disorder, unspecified type    Insomnia, unspecified type, intermittent use of Ambien    Anxiety  -     escitalopram oxalate (LEXAPRO) 20 MG tablet; Take 1 tablet (20 mg total) by mouth once daily.    Other orders  -     zolpidem (AMBIEN) 10 mg Tab; Take 1 tablet (10 mg total) by mouth nightly as needed.  -     valsartan (DIOVAN) 80 MG tablet; Take 1 tablet (80 mg total) by mouth once daily.              Based on underlying anxiety issues, access would not be therapeutic"This note  "will not be shared with the patient."  "

## 2019-09-10 ENCOUNTER — PATIENT MESSAGE (OUTPATIENT)
Dept: FAMILY MEDICINE | Facility: CLINIC | Age: 49
End: 2019-09-10

## 2019-09-10 RX ORDER — VALSARTAN 80 MG/1
80 TABLET ORAL DAILY
Qty: 90 TABLET | Refills: 3 | Status: SHIPPED | OUTPATIENT
Start: 2019-09-10 | End: 2019-09-18 | Stop reason: SDUPTHER

## 2019-09-18 ENCOUNTER — TELEPHONE (OUTPATIENT)
Dept: FAMILY MEDICINE | Facility: CLINIC | Age: 49
End: 2019-09-18

## 2019-09-18 RX ORDER — VALSARTAN 160 MG/1
160 TABLET ORAL DAILY
Qty: 90 TABLET | Refills: 3 | Status: SHIPPED | OUTPATIENT
Start: 2019-09-18 | End: 2020-07-21 | Stop reason: SDUPTHER

## 2019-09-18 NOTE — TELEPHONE ENCOUNTER
Looks like she is responding to the double dose of 160 and so I will change it send to the pharmacy.    Please let her know we will be doing that    Please also remind her that she can send messages through [facility] and it will be much more direct

## 2019-09-18 NOTE — TELEPHONE ENCOUNTER
Patient states BP still high 135/95 one week ago and she started two pills and today it is 120/80 @ 1pm. Please advise

## 2019-09-18 NOTE — TELEPHONE ENCOUNTER
----- Message from Dajuan Ocampo sent at 9/18/2019  1:14 PM CDT -----  Contact: Paula [phone]  Type: Patient Call Back    Who called:Paula    What is the request in detail: The patient is calling to notify the staff that her bp has still been high. She would like to know if the dosage for her medication: valsartan (DIOVAN) 80 MG tablet , can be upped    Can the clinic reply by [facility]?no    Would the patient rather a call back or a response via [facility]? Call back     Best call back number:[phone]

## 2020-07-21 ENCOUNTER — OFFICE VISIT (OUTPATIENT)
Dept: FAMILY MEDICINE | Facility: CLINIC | Age: 50
End: 2020-07-21
Payer: COMMERCIAL

## 2020-07-21 ENCOUNTER — LAB VISIT (OUTPATIENT)
Dept: LAB | Facility: HOSPITAL | Age: 50
End: 2020-07-21
Attending: INTERNAL MEDICINE
Payer: COMMERCIAL

## 2020-07-21 VITALS
HEIGHT: 69 IN | SYSTOLIC BLOOD PRESSURE: 140 MMHG | DIASTOLIC BLOOD PRESSURE: 82 MMHG | OXYGEN SATURATION: 97 % | BODY MASS INDEX: 26.38 KG/M2 | TEMPERATURE: 98 F | RESPIRATION RATE: 20 BRPM | WEIGHT: 178.13 LBS | HEART RATE: 110 BPM

## 2020-07-21 DIAGNOSIS — F39 MOOD DISORDER: ICD-10-CM

## 2020-07-21 DIAGNOSIS — Z00.00 ROUTINE MEDICAL EXAM: ICD-10-CM

## 2020-07-21 DIAGNOSIS — F41.9 ANXIETY DISORDER, UNSPECIFIED TYPE: ICD-10-CM

## 2020-07-21 DIAGNOSIS — Z00.00 ROUTINE MEDICAL EXAM: Primary | ICD-10-CM

## 2020-07-21 DIAGNOSIS — G47.00 INSOMNIA, UNSPECIFIED TYPE: ICD-10-CM

## 2020-07-21 DIAGNOSIS — F41.9 ANXIETY: ICD-10-CM

## 2020-07-21 DIAGNOSIS — Z12.12 SCREENING FOR COLORECTAL CANCER: ICD-10-CM

## 2020-07-21 DIAGNOSIS — I10 ESSENTIAL HYPERTENSION: ICD-10-CM

## 2020-07-21 DIAGNOSIS — Z12.11 SCREENING FOR COLORECTAL CANCER: ICD-10-CM

## 2020-07-21 LAB
ALBUMIN SERPL BCP-MCNC: 4.4 G/DL (ref 3.5–5.2)
ALP SERPL-CCNC: 78 U/L (ref 55–135)
ALT SERPL W/O P-5'-P-CCNC: 50 U/L (ref 10–44)
ANION GAP SERPL CALC-SCNC: 8 MMOL/L (ref 8–16)
AST SERPL-CCNC: 37 U/L (ref 10–40)
BASOPHILS # BLD AUTO: 0.03 K/UL (ref 0–0.2)
BASOPHILS NFR BLD: 0.5 % (ref 0–1.9)
BILIRUB SERPL-MCNC: 0.4 MG/DL (ref 0.1–1)
BUN SERPL-MCNC: 15 MG/DL (ref 6–20)
CALCIUM SERPL-MCNC: 9.8 MG/DL (ref 8.7–10.5)
CHLORIDE SERPL-SCNC: 103 MMOL/L (ref 95–110)
CO2 SERPL-SCNC: 29 MMOL/L (ref 23–29)
CREAT SERPL-MCNC: 0.9 MG/DL (ref 0.5–1.4)
DIFFERENTIAL METHOD: ABNORMAL
EOSINOPHIL # BLD AUTO: 0.1 K/UL (ref 0–0.5)
EOSINOPHIL NFR BLD: 0.8 % (ref 0–8)
ERYTHROCYTE [DISTWIDTH] IN BLOOD BY AUTOMATED COUNT: 11.6 % (ref 11.5–14.5)
EST. GFR  (AFRICAN AMERICAN): >60 ML/MIN/1.73 M^2
EST. GFR  (NON AFRICAN AMERICAN): >60 ML/MIN/1.73 M^2
GLUCOSE SERPL-MCNC: 97 MG/DL (ref 70–110)
HCT VFR BLD AUTO: 40.7 % (ref 37–48.5)
HGB BLD-MCNC: 12.9 G/DL (ref 12–16)
IMM GRANULOCYTES # BLD AUTO: 0.01 K/UL (ref 0–0.04)
IMM GRANULOCYTES NFR BLD AUTO: 0.2 % (ref 0–0.5)
LYMPHOCYTES # BLD AUTO: 1.5 K/UL (ref 1–4.8)
LYMPHOCYTES NFR BLD: 23.5 % (ref 18–48)
MCH RBC QN AUTO: 31.8 PG (ref 27–31)
MCHC RBC AUTO-ENTMCNC: 31.7 G/DL (ref 32–36)
MCV RBC AUTO: 100 FL (ref 82–98)
MONOCYTES # BLD AUTO: 0.4 K/UL (ref 0.3–1)
MONOCYTES NFR BLD: 6.6 % (ref 4–15)
NEUTROPHILS # BLD AUTO: 4.3 K/UL (ref 1.8–7.7)
NEUTROPHILS NFR BLD: 68.4 % (ref 38–73)
NRBC BLD-RTO: 0 /100 WBC
PLATELET # BLD AUTO: 251 K/UL (ref 150–350)
PMV BLD AUTO: 11 FL (ref 9.2–12.9)
POTASSIUM SERPL-SCNC: 4 MMOL/L (ref 3.5–5.1)
PROT SERPL-MCNC: 7.4 G/DL (ref 6–8.4)
RBC # BLD AUTO: 4.06 M/UL (ref 4–5.4)
SODIUM SERPL-SCNC: 140 MMOL/L (ref 136–145)
TSH SERPL DL<=0.005 MIU/L-ACNC: 1.16 UIU/ML (ref 0.4–4)
WBC # BLD AUTO: 6.33 K/UL (ref 3.9–12.7)

## 2020-07-21 PROCEDURE — 99999 PR PBB SHADOW E&M-EST. PATIENT-LVL III: ICD-10-PCS | Mod: PBBFAC,,, | Performed by: INTERNAL MEDICINE

## 2020-07-21 PROCEDURE — 99396 PR PREVENTIVE VISIT,EST,40-64: ICD-10-PCS | Mod: S$GLB,,, | Performed by: INTERNAL MEDICINE

## 2020-07-21 PROCEDURE — 36415 COLL VENOUS BLD VENIPUNCTURE: CPT | Mod: PO

## 2020-07-21 PROCEDURE — 3079F PR MOST RECENT DIASTOLIC BLOOD PRESSURE 80-89 MM HG: ICD-10-PCS | Mod: CPTII,S$GLB,, | Performed by: INTERNAL MEDICINE

## 2020-07-21 PROCEDURE — 3079F DIAST BP 80-89 MM HG: CPT | Mod: CPTII,S$GLB,, | Performed by: INTERNAL MEDICINE

## 2020-07-21 PROCEDURE — 3008F BODY MASS INDEX DOCD: CPT | Mod: CPTII,S$GLB,, | Performed by: INTERNAL MEDICINE

## 2020-07-21 PROCEDURE — 80053 COMPREHEN METABOLIC PANEL: CPT

## 2020-07-21 PROCEDURE — 3008F PR BODY MASS INDEX (BMI) DOCUMENTED: ICD-10-PCS | Mod: CPTII,S$GLB,, | Performed by: INTERNAL MEDICINE

## 2020-07-21 PROCEDURE — 85025 COMPLETE CBC W/AUTO DIFF WBC: CPT

## 2020-07-21 PROCEDURE — 3077F SYST BP >= 140 MM HG: CPT | Mod: CPTII,S$GLB,, | Performed by: INTERNAL MEDICINE

## 2020-07-21 PROCEDURE — 3077F PR MOST RECENT SYSTOLIC BLOOD PRESSURE >= 140 MM HG: ICD-10-PCS | Mod: CPTII,S$GLB,, | Performed by: INTERNAL MEDICINE

## 2020-07-21 PROCEDURE — 84443 ASSAY THYROID STIM HORMONE: CPT

## 2020-07-21 PROCEDURE — 99999 PR PBB SHADOW E&M-EST. PATIENT-LVL III: CPT | Mod: PBBFAC,,, | Performed by: INTERNAL MEDICINE

## 2020-07-21 PROCEDURE — 99396 PREV VISIT EST AGE 40-64: CPT | Mod: S$GLB,,, | Performed by: INTERNAL MEDICINE

## 2020-07-21 RX ORDER — ESCITALOPRAM OXALATE 20 MG/1
20 TABLET ORAL DAILY
Qty: 90 TABLET | Refills: 3 | Status: SHIPPED | OUTPATIENT
Start: 2020-07-21 | End: 2021-08-03 | Stop reason: SDUPTHER

## 2020-07-21 RX ORDER — ZOLPIDEM TARTRATE 10 MG/1
10 TABLET ORAL NIGHTLY PRN
Qty: 30 TABLET | Refills: 5 | Status: SHIPPED | OUTPATIENT
Start: 2020-07-21 | End: 2021-08-03 | Stop reason: SDUPTHER

## 2020-07-21 RX ORDER — VALSARTAN 160 MG/1
160 TABLET ORAL DAILY
Qty: 90 TABLET | Refills: 3 | Status: SHIPPED | OUTPATIENT
Start: 2020-07-21 | End: 2021-08-03 | Stop reason: SDUPTHER

## 2020-07-21 RX ORDER — HYDROCHLOROTHIAZIDE 12.5 MG/1
12.5 TABLET ORAL DAILY
Qty: 90 TABLET | Refills: 3 | Status: SHIPPED | OUTPATIENT
Start: 2020-07-21 | End: 2021-08-03 | Stop reason: SDUPTHER

## 2020-07-21 NOTE — PROGRESS NOTES
Chief complaint physical exam    49-year-old white female still active and follows up with her GYN.  Last mammogram and GYN check was 8/19.  Blood pressures were generally  running high.  She is exercising less.  She gained a little bit of weight.  Her job is more sedentary.  We discussed establishing a diagnosis of hypertension in 2017.  We discussed low-dose HCTZ and expectations as well as dose adjustments and monitoring as well as the potential for hypotension when working out in the heat.  stool test sent to her is neg 2019.  Discussed the importance of colonoscopy and she was willing but not done?.  It was set up for this year but put off secondary to the pandemic.  She is willing to get it set it would be most convenient for her to get it done at the [facility].    She is working for home due to the pandemic.  Her 's retired so there has been a little bit more stress with both of them being home but she is walking her dog every day.  She has only gained 5 lb.    Blood pressure home runs normal in the 120s but we discussed issues related to lisinopril her father did have non smoking related lung cancer.  She  switched to valsartan 80 and she will monitor so that we can adjust the correct dose.  We also discussed long-term increased risk of dementia possibly associated with Ambien.  She does not take it that often but she is now aware of the potential risk but it is effective for.    CONST: weight stable. EYES: no vision change. ENT: no sore throat. CV: no chest pain w/ exertion. RESP: no shortness of breath. GI: no nausea, vomiting, diarrhea. No dysphagia. : no urinary issues. MUSCULOSKELETAL: no new myalgias or arthralgias. SKIN: no new changes. NEURO: no focal deficits. PSYCH: no new issues. ENDOCRINE: no polyuria. HEME: no lymph nodes. ALLERGY: no general pruritis.     PAST MEDICAL HISTORY:    1. Anxiety/insomnia -Lexapro, occasional use of Ambien  2.  Hypertension formally diagnosed    3. [facility].   4.  Nail dermatophytosis    5.  Isolated ALT elevation resolved                                                                                                                         PAST SURGICAL HISTORY:  None.                                                                                                                             SOCIAL HISTORY:  . Former smoker.  Quit in  and smoked for 10 years        prior.  She drinks alcohol on the weekends.                                                                                                               FAMILY HISTORY:  Mom  with no problems.  Father  last year at 76    of lung cancer and was a nonsmoker but was a book keeper in [place].    One sister in 40s in good health.        Gen: no distress  EYES: conjunctiva clear, non-icteric, PERRL  ENT: nose clear, nasal mucosa normal, oropharynx clear and moist, teeth good  NECK:supple, thyroid non-palpable  RESP: effort is good, lungs clear  CV: heart RRR w/o murmur, gallops or rubs; no carotid bruits, no edema  GI: abdomen soft, non-distended, non-tender, no hepatosplenomegaly  MS: gait normal, no clubbing or cyanosis of the digits  SKIN: no rashes, warm to touch            Paula was seen today for follow-up and medication refill.    Diagnoses and all orders for this visit:    Routine medical exam, update routine lab work, her lipids have always been normal so will skip and check every 3-5 years.  -     Comprehensive metabolic panel; Future  -     CBC auto differential; Future  -     TSH; Future    Screening for colorectal cancer, colonoscopy    Essential hypertension, chronic and stable, continue to exercise  -     Comprehensive metabolic panel; Future  -     CBC auto differential; Future  -     TSH; Future  -     hydroCHLOROthiazide (HYDRODIURIL) 12.5 MG Tab; Take 1 tablet (12.5 mg total) by mouth once daily.    Anxiety disorder, unspecified  "type    Insomnia, unspecified type, intermittent use of Ambien which is good    Mood disorder, chronic and stable    Anxiety  -     escitalopram oxalate (LEXAPRO) 20 MG tablet; Take 1 tablet (20 mg total) by mouth once daily.    Other orders  -     valsartan (DIOVAN) 160 MG tablet; Take 1 tablet (160 mg total) by mouth once daily.  -     zolpidem (AMBIEN) 10 mg Tab; Take 1 tablet (10 mg total) by mouth nightly as needed.              Based on underlying anxiety issues, access would not be therapeutic"This note will not be shared with the patient."    "

## 2020-07-23 DIAGNOSIS — Z12.11 COLON CANCER SCREENING: ICD-10-CM

## 2020-09-04 DIAGNOSIS — Z12.39 BREAST CANCER SCREENING: ICD-10-CM

## 2020-09-16 ENCOUNTER — PATIENT OUTREACH (OUTPATIENT)
Dept: ADMINISTRATIVE | Facility: HOSPITAL | Age: 50
End: 2020-09-16

## 2020-09-28 ENCOUNTER — PATIENT OUTREACH (OUTPATIENT)
Dept: ADMINISTRATIVE | Facility: HOSPITAL | Age: 50
End: 2020-09-28

## 2020-09-29 ENCOUNTER — TELEPHONE (OUTPATIENT)
Dept: FAMILY MEDICINE | Facility: CLINIC | Age: 50
End: 2020-09-29

## 2020-10-12 ENCOUNTER — CLINICAL SUPPORT (OUTPATIENT)
Dept: FAMILY MEDICINE | Facility: CLINIC | Age: 50
End: 2020-10-12
Payer: COMMERCIAL

## 2020-10-12 VITALS
HEART RATE: 91 BPM | DIASTOLIC BLOOD PRESSURE: 80 MMHG | TEMPERATURE: 97 F | SYSTOLIC BLOOD PRESSURE: 138 MMHG | RESPIRATION RATE: 20 BRPM | OXYGEN SATURATION: 98 %

## 2020-10-12 DIAGNOSIS — I10 ESSENTIAL HYPERTENSION: Primary | ICD-10-CM

## 2020-10-12 PROCEDURE — 99999 PR PBB SHADOW E&M-EST. PATIENT-LVL III: ICD-10-PCS | Mod: PBBFAC,,,

## 2020-10-12 PROCEDURE — 99499 NO LOS: ICD-10-PCS | Mod: S$GLB,,, | Performed by: INTERNAL MEDICINE

## 2020-10-12 PROCEDURE — 99499 UNLISTED E&M SERVICE: CPT | Mod: S$GLB,,, | Performed by: INTERNAL MEDICINE

## 2020-10-12 PROCEDURE — 99999 PR PBB SHADOW E&M-EST. PATIENT-LVL III: CPT | Mod: PBBFAC,,,

## 2020-10-12 NOTE — PROGRESS NOTES
Paula Rodriguez 50 y.o. female is here today for Blood Pressure check.   History of HTN yes.    Review of patient's allergies indicates:  No Known Allergies  Creatinine   Date Value Ref Range Status   07/21/2020 0.9 0.5 - 1.4 mg/dL Final     Sodium   Date Value Ref Range Status   07/21/2020 140 136 - 145 mmol/L Final     Potassium   Date Value Ref Range Status   07/21/2020 4.0 3.5 - 5.1 mmol/L Final   ]  Patient verifies taking blood pressure medications on a regular basis at the same time of the day.     Current Outpatient Medications:     escitalopram oxalate (LEXAPRO) 20 MG tablet, Take 1 tablet (20 mg total) by mouth once daily., Disp: 90 tablet, Rfl: 3    hydroCHLOROthiazide (HYDRODIURIL) 12.5 MG Tab, Take 1 tablet (12.5 mg total) by mouth once daily., Disp: 90 tablet, Rfl: 3    valsartan (DIOVAN) 160 MG tablet, Take 1 tablet (160 mg total) by mouth once daily., Disp: 90 tablet, Rfl: 3    zolpidem (AMBIEN) 10 mg Tab, Take 1 tablet (10 mg total) by mouth nightly as needed., Disp: 30 tablet, Rfl: 5  Does patient have record of home blood pressure readings no.    .   Last dose of blood pressure medication was taken at 9 this am.  Patient is asymptomatic.   Complains of none    Vitals:    10/12/20 1344 10/12/20 1358   BP: (!) 146/82 138/80   BP Location: Left arm Left arm   Patient Position: Sitting Sitting   BP Method: Medium (Manual) Medium (Manual)   Pulse: 88 91   Resp:  20   Temp: 97.2 °F (36.2 °C)    SpO2: 98% 98%          informed of nurse visit.

## 2021-05-16 ENCOUNTER — HOSPITAL ENCOUNTER (EMERGENCY)
Facility: HOSPITAL | Age: 51
Discharge: HOME | End: 2021-05-17
Attending: EMERGENCY MEDICINE
Payer: COMMERCIAL

## 2021-05-16 DIAGNOSIS — F10.920 ALCOHOLIC INTOXICATION WITHOUT COMPLICATION: Primary | ICD-10-CM

## 2021-05-16 PROCEDURE — 99282 EMERGENCY DEPT VISIT SF MDM: CPT

## 2021-05-17 VITALS
BODY MASS INDEX: 24.44 KG/M2 | DIASTOLIC BLOOD PRESSURE: 78 MMHG | WEIGHT: 165 LBS | HEIGHT: 69 IN | OXYGEN SATURATION: 95 % | TEMPERATURE: 100 F | HEART RATE: 106 BPM | RESPIRATION RATE: 20 BRPM | SYSTOLIC BLOOD PRESSURE: 142 MMHG

## 2021-07-21 ENCOUNTER — PATIENT MESSAGE (OUTPATIENT)
Dept: ADMINISTRATIVE | Facility: HOSPITAL | Age: 51
End: 2021-07-21

## 2021-07-21 ENCOUNTER — PATIENT OUTREACH (OUTPATIENT)
Dept: ADMINISTRATIVE | Facility: HOSPITAL | Age: 51
End: 2021-07-21

## 2021-08-03 ENCOUNTER — OFFICE VISIT (OUTPATIENT)
Dept: FAMILY MEDICINE | Facility: CLINIC | Age: 51
End: 2021-08-03
Payer: COMMERCIAL

## 2021-08-03 ENCOUNTER — LAB VISIT (OUTPATIENT)
Dept: LAB | Facility: HOSPITAL | Age: 51
End: 2021-08-03
Attending: INTERNAL MEDICINE
Payer: COMMERCIAL

## 2021-08-03 VITALS
HEART RATE: 82 BPM | TEMPERATURE: 99 F | BODY MASS INDEX: 25.63 KG/M2 | DIASTOLIC BLOOD PRESSURE: 90 MMHG | WEIGHT: 173.06 LBS | SYSTOLIC BLOOD PRESSURE: 140 MMHG | OXYGEN SATURATION: 97 % | HEIGHT: 69 IN

## 2021-08-03 DIAGNOSIS — I10 ESSENTIAL HYPERTENSION: ICD-10-CM

## 2021-08-03 DIAGNOSIS — Z00.00 ROUTINE MEDICAL EXAM: Primary | ICD-10-CM

## 2021-08-03 DIAGNOSIS — K76.0 FATTY LIVER: ICD-10-CM

## 2021-08-03 DIAGNOSIS — F39 MOOD DISORDER: ICD-10-CM

## 2021-08-03 DIAGNOSIS — F41.9 ANXIETY DISORDER, UNSPECIFIED TYPE: ICD-10-CM

## 2021-08-03 DIAGNOSIS — Z00.00 ROUTINE MEDICAL EXAM: ICD-10-CM

## 2021-08-03 DIAGNOSIS — F41.9 ANXIETY: ICD-10-CM

## 2021-08-03 DIAGNOSIS — R79.89 ABNORMAL LIVER FUNCTION TESTS: ICD-10-CM

## 2021-08-03 DIAGNOSIS — Z12.11 SCREENING FOR COLORECTAL CANCER: ICD-10-CM

## 2021-08-03 DIAGNOSIS — D75.89 MACROCYTOSIS: ICD-10-CM

## 2021-08-03 DIAGNOSIS — Z12.12 SCREENING FOR COLORECTAL CANCER: ICD-10-CM

## 2021-08-03 LAB
ALBUMIN SERPL BCP-MCNC: 4.5 G/DL (ref 3.5–5.2)
ALP SERPL-CCNC: 77 U/L (ref 55–135)
ALT SERPL W/O P-5'-P-CCNC: 30 U/L (ref 10–44)
ANION GAP SERPL CALC-SCNC: 10 MMOL/L (ref 8–16)
AST SERPL-CCNC: 26 U/L (ref 10–40)
BASOPHILS # BLD AUTO: 0.03 K/UL (ref 0–0.2)
BASOPHILS NFR BLD: 0.6 % (ref 0–1.9)
BILIRUB SERPL-MCNC: 0.5 MG/DL (ref 0.1–1)
BUN SERPL-MCNC: 14 MG/DL (ref 6–20)
CALCIUM SERPL-MCNC: 9.8 MG/DL (ref 8.7–10.5)
CHLORIDE SERPL-SCNC: 103 MMOL/L (ref 95–110)
CHOLEST SERPL-MCNC: 229 MG/DL (ref 120–199)
CHOLEST/HDLC SERPL: 2.5 {RATIO} (ref 2–5)
CO2 SERPL-SCNC: 27 MMOL/L (ref 23–29)
CREAT SERPL-MCNC: 0.9 MG/DL (ref 0.5–1.4)
DIFFERENTIAL METHOD: ABNORMAL
EOSINOPHIL # BLD AUTO: 0.1 K/UL (ref 0–0.5)
EOSINOPHIL NFR BLD: 1.3 % (ref 0–8)
ERYTHROCYTE [DISTWIDTH] IN BLOOD BY AUTOMATED COUNT: 11.9 % (ref 11.5–14.5)
EST. GFR  (AFRICAN AMERICAN): >60 ML/MIN/1.73 M^2
EST. GFR  (NON AFRICAN AMERICAN): >60 ML/MIN/1.73 M^2
ESTIMATED AVG GLUCOSE: 97 MG/DL (ref 68–131)
GLUCOSE SERPL-MCNC: 94 MG/DL (ref 70–110)
HBA1C MFR BLD: 5 % (ref 4–5.6)
HCT VFR BLD AUTO: 41.9 % (ref 37–48.5)
HDLC SERPL-MCNC: 93 MG/DL (ref 40–75)
HDLC SERPL: 40.6 % (ref 20–50)
HGB BLD-MCNC: 14 G/DL (ref 12–16)
IMM GRANULOCYTES # BLD AUTO: 0.01 K/UL (ref 0–0.04)
IMM GRANULOCYTES NFR BLD AUTO: 0.2 % (ref 0–0.5)
LDLC SERPL CALC-MCNC: 122.4 MG/DL (ref 63–159)
LYMPHOCYTES # BLD AUTO: 1.3 K/UL (ref 1–4.8)
LYMPHOCYTES NFR BLD: 26.5 % (ref 18–48)
MCH RBC QN AUTO: 31.6 PG (ref 27–31)
MCHC RBC AUTO-ENTMCNC: 33.4 G/DL (ref 32–36)
MCV RBC AUTO: 95 FL (ref 82–98)
MONOCYTES # BLD AUTO: 0.4 K/UL (ref 0.3–1)
MONOCYTES NFR BLD: 9 % (ref 4–15)
NEUTROPHILS # BLD AUTO: 3 K/UL (ref 1.8–7.7)
NEUTROPHILS NFR BLD: 62.4 % (ref 38–73)
NONHDLC SERPL-MCNC: 136 MG/DL
NRBC BLD-RTO: 0 /100 WBC
PLATELET # BLD AUTO: 253 K/UL (ref 150–450)
PMV BLD AUTO: 10.5 FL (ref 9.2–12.9)
POTASSIUM SERPL-SCNC: 4.5 MMOL/L (ref 3.5–5.1)
PROT SERPL-MCNC: 7.6 G/DL (ref 6–8.4)
RBC # BLD AUTO: 4.43 M/UL (ref 4–5.4)
SODIUM SERPL-SCNC: 140 MMOL/L (ref 136–145)
TRIGL SERPL-MCNC: 68 MG/DL (ref 30–150)
TSH SERPL DL<=0.005 MIU/L-ACNC: 1.67 UIU/ML (ref 0.4–4)
VIT B12 SERPL-MCNC: 354 PG/ML (ref 210–950)
WBC # BLD AUTO: 4.8 K/UL (ref 3.9–12.7)

## 2021-08-03 PROCEDURE — 36415 COLL VENOUS BLD VENIPUNCTURE: CPT | Mod: PO | Performed by: INTERNAL MEDICINE

## 2021-08-03 PROCEDURE — 3077F SYST BP >= 140 MM HG: CPT | Mod: CPTII,S$GLB,, | Performed by: INTERNAL MEDICINE

## 2021-08-03 PROCEDURE — 99396 PREV VISIT EST AGE 40-64: CPT | Mod: S$GLB,,, | Performed by: INTERNAL MEDICINE

## 2021-08-03 PROCEDURE — 82607 VITAMIN B-12: CPT | Performed by: INTERNAL MEDICINE

## 2021-08-03 PROCEDURE — 85025 COMPLETE CBC W/AUTO DIFF WBC: CPT | Performed by: INTERNAL MEDICINE

## 2021-08-03 PROCEDURE — 84443 ASSAY THYROID STIM HORMONE: CPT | Performed by: INTERNAL MEDICINE

## 2021-08-03 PROCEDURE — 99999 PR PBB SHADOW E&M-EST. PATIENT-LVL III: CPT | Mod: PBBFAC,,, | Performed by: INTERNAL MEDICINE

## 2021-08-03 PROCEDURE — 80061 LIPID PANEL: CPT | Performed by: INTERNAL MEDICINE

## 2021-08-03 PROCEDURE — 99999 PR PBB SHADOW E&M-EST. PATIENT-LVL III: ICD-10-PCS | Mod: PBBFAC,,, | Performed by: INTERNAL MEDICINE

## 2021-08-03 PROCEDURE — 99396 PR PREVENTIVE VISIT,EST,40-64: ICD-10-PCS | Mod: S$GLB,,, | Performed by: INTERNAL MEDICINE

## 2021-08-03 PROCEDURE — 3008F PR BODY MASS INDEX (BMI) DOCUMENTED: ICD-10-PCS | Mod: CPTII,S$GLB,, | Performed by: INTERNAL MEDICINE

## 2021-08-03 PROCEDURE — 3077F PR MOST RECENT SYSTOLIC BLOOD PRESSURE >= 140 MM HG: ICD-10-PCS | Mod: CPTII,S$GLB,, | Performed by: INTERNAL MEDICINE

## 2021-08-03 PROCEDURE — 3080F DIAST BP >= 90 MM HG: CPT | Mod: CPTII,S$GLB,, | Performed by: INTERNAL MEDICINE

## 2021-08-03 PROCEDURE — 1159F PR MEDICATION LIST DOCUMENTED IN MEDICAL RECORD: ICD-10-PCS | Mod: CPTII,S$GLB,, | Performed by: INTERNAL MEDICINE

## 2021-08-03 PROCEDURE — 3008F BODY MASS INDEX DOCD: CPT | Mod: CPTII,S$GLB,, | Performed by: INTERNAL MEDICINE

## 2021-08-03 PROCEDURE — 80053 COMPREHEN METABOLIC PANEL: CPT | Performed by: INTERNAL MEDICINE

## 2021-08-03 PROCEDURE — 83036 HEMOGLOBIN GLYCOSYLATED A1C: CPT | Performed by: INTERNAL MEDICINE

## 2021-08-03 PROCEDURE — 3080F PR MOST RECENT DIASTOLIC BLOOD PRESSURE >= 90 MM HG: ICD-10-PCS | Mod: CPTII,S$GLB,, | Performed by: INTERNAL MEDICINE

## 2021-08-03 PROCEDURE — 1159F MED LIST DOCD IN RCRD: CPT | Mod: CPTII,S$GLB,, | Performed by: INTERNAL MEDICINE

## 2021-08-03 RX ORDER — ESCITALOPRAM OXALATE 20 MG/1
20 TABLET ORAL DAILY
Qty: 90 TABLET | Refills: 3 | Status: SHIPPED | OUTPATIENT
Start: 2021-08-03 | End: 2022-07-28 | Stop reason: SDUPTHER

## 2021-08-03 RX ORDER — VALSARTAN 160 MG/1
160 TABLET ORAL DAILY
Qty: 90 TABLET | Refills: 3 | Status: SHIPPED | OUTPATIENT
Start: 2021-08-03 | End: 2022-07-28 | Stop reason: SDUPTHER

## 2021-08-03 RX ORDER — HYDROCHLOROTHIAZIDE 12.5 MG/1
12.5 TABLET ORAL DAILY
Qty: 90 TABLET | Refills: 3 | Status: SHIPPED | OUTPATIENT
Start: 2021-08-03 | End: 2022-07-28 | Stop reason: SDUPTHER

## 2021-08-03 RX ORDER — ZOLPIDEM TARTRATE 10 MG/1
10 TABLET ORAL NIGHTLY PRN
Qty: 30 TABLET | Refills: 5 | Status: SHIPPED | OUTPATIENT
Start: 2021-08-03 | End: 2022-07-28 | Stop reason: SDUPTHER

## 2021-10-27 ENCOUNTER — TELEPHONE (OUTPATIENT)
Dept: FAMILY MEDICINE | Facility: CLINIC | Age: 51
End: 2021-10-27
Payer: COMMERCIAL

## 2021-10-27 ENCOUNTER — PATIENT OUTREACH (OUTPATIENT)
Dept: ADMINISTRATIVE | Facility: HOSPITAL | Age: 51
End: 2021-10-27
Payer: COMMERCIAL

## 2021-12-08 DIAGNOSIS — Z12.31 OTHER SCREENING MAMMOGRAM: ICD-10-CM

## 2022-07-28 ENCOUNTER — OFFICE VISIT (OUTPATIENT)
Dept: FAMILY MEDICINE | Facility: CLINIC | Age: 52
End: 2022-07-28
Payer: COMMERCIAL

## 2022-07-28 VITALS
TEMPERATURE: 98 F | SYSTOLIC BLOOD PRESSURE: 122 MMHG | OXYGEN SATURATION: 97 % | HEIGHT: 69 IN | DIASTOLIC BLOOD PRESSURE: 64 MMHG | HEART RATE: 86 BPM | WEIGHT: 167.75 LBS | BODY MASS INDEX: 24.85 KG/M2

## 2022-07-28 DIAGNOSIS — I10 ESSENTIAL HYPERTENSION: ICD-10-CM

## 2022-07-28 DIAGNOSIS — G47.00 INSOMNIA, UNSPECIFIED TYPE: ICD-10-CM

## 2022-07-28 DIAGNOSIS — F39 MOOD DISORDER: ICD-10-CM

## 2022-07-28 DIAGNOSIS — Z63.4 BEREAVEMENT: ICD-10-CM

## 2022-07-28 DIAGNOSIS — F41.9 ANXIETY DISORDER, UNSPECIFIED TYPE: Primary | ICD-10-CM

## 2022-07-28 DIAGNOSIS — F41.9 ANXIETY: ICD-10-CM

## 2022-07-28 PROCEDURE — 99999 PR PBB SHADOW E&M-EST. PATIENT-LVL III: ICD-10-PCS | Mod: PBBFAC,,, | Performed by: INTERNAL MEDICINE

## 2022-07-28 PROCEDURE — 3008F BODY MASS INDEX DOCD: CPT | Mod: CPTII,S$GLB,, | Performed by: INTERNAL MEDICINE

## 2022-07-28 PROCEDURE — 99214 OFFICE O/P EST MOD 30 MIN: CPT | Mod: S$GLB,,, | Performed by: INTERNAL MEDICINE

## 2022-07-28 PROCEDURE — 3074F SYST BP LT 130 MM HG: CPT | Mod: CPTII,S$GLB,, | Performed by: INTERNAL MEDICINE

## 2022-07-28 PROCEDURE — 99999 PR PBB SHADOW E&M-EST. PATIENT-LVL III: CPT | Mod: PBBFAC,,, | Performed by: INTERNAL MEDICINE

## 2022-07-28 PROCEDURE — 3008F PR BODY MASS INDEX (BMI) DOCUMENTED: ICD-10-PCS | Mod: CPTII,S$GLB,, | Performed by: INTERNAL MEDICINE

## 2022-07-28 PROCEDURE — 1159F PR MEDICATION LIST DOCUMENTED IN MEDICAL RECORD: ICD-10-PCS | Mod: CPTII,S$GLB,, | Performed by: INTERNAL MEDICINE

## 2022-07-28 PROCEDURE — 4010F ACE/ARB THERAPY RXD/TAKEN: CPT | Mod: CPTII,S$GLB,, | Performed by: INTERNAL MEDICINE

## 2022-07-28 PROCEDURE — 3078F PR MOST RECENT DIASTOLIC BLOOD PRESSURE < 80 MM HG: ICD-10-PCS | Mod: CPTII,S$GLB,, | Performed by: INTERNAL MEDICINE

## 2022-07-28 PROCEDURE — 3074F PR MOST RECENT SYSTOLIC BLOOD PRESSURE < 130 MM HG: ICD-10-PCS | Mod: CPTII,S$GLB,, | Performed by: INTERNAL MEDICINE

## 2022-07-28 PROCEDURE — 99214 PR OFFICE/OUTPT VISIT, EST, LEVL IV, 30-39 MIN: ICD-10-PCS | Mod: S$GLB,,, | Performed by: INTERNAL MEDICINE

## 2022-07-28 PROCEDURE — 3078F DIAST BP <80 MM HG: CPT | Mod: CPTII,S$GLB,, | Performed by: INTERNAL MEDICINE

## 2022-07-28 PROCEDURE — 4010F PR ACE/ARB THEARPY RXD/TAKEN: ICD-10-PCS | Mod: CPTII,S$GLB,, | Performed by: INTERNAL MEDICINE

## 2022-07-28 PROCEDURE — 1159F MED LIST DOCD IN RCRD: CPT | Mod: CPTII,S$GLB,, | Performed by: INTERNAL MEDICINE

## 2022-07-28 RX ORDER — HYDROCHLOROTHIAZIDE 12.5 MG/1
12.5 TABLET ORAL DAILY
Qty: 90 TABLET | Refills: 3 | Status: SHIPPED | OUTPATIENT
Start: 2022-07-28 | End: 2023-07-19 | Stop reason: SDUPTHER

## 2022-07-28 RX ORDER — ESCITALOPRAM OXALATE 20 MG/1
20 TABLET ORAL DAILY
Qty: 90 TABLET | Refills: 3 | Status: SHIPPED | OUTPATIENT
Start: 2022-07-28 | End: 2023-02-16

## 2022-07-28 RX ORDER — VALSARTAN 160 MG/1
160 TABLET ORAL DAILY
Qty: 90 TABLET | Refills: 3 | Status: SHIPPED | OUTPATIENT
Start: 2022-07-28 | End: 2023-05-15 | Stop reason: SDUPTHER

## 2022-07-28 RX ORDER — ZOLPIDEM TARTRATE 10 MG/1
10 TABLET ORAL NIGHTLY PRN
Qty: 30 TABLET | Refills: 5 | Status: SHIPPED | OUTPATIENT
Start: 2022-07-28 | End: 2023-02-16

## 2022-07-28 SDOH — SOCIAL DETERMINANTS OF HEALTH (SDOH): DISSAPEARANCE AND DEATH OF FAMILY MEMBER: Z63.4

## 2022-07-28 NOTE — PROGRESS NOTES
Chief complaint follow-up on anxiety and insomnia    52-year-old white female still active .  She is working for home due to the pandemic.  Her 's retired and then committed suicide.   At the end of the day it is hard for her to wind down.  She does have a couple of drinks to help her relax.  She then has trouble falling asleep but not related to the alcohol.  She tolerated Ambien in the past without any problems.  We discussed other potential side effects of Ambien.  She has had no withdrawal the next day.  She will use it with caution if drinking but never had any issues in the past.  She does feel anxieties fairly under control and not interfering with function on the Lexapro 20.  We discussed possible addition of BuSpar, Remeron, trazodone does may have all of the side effects.  Primary issue with Ambien was limited covered by insurance and so advised her just to pay for with cash and the good Rx card .    She definitely was more depressed with worsening insomnia after the death of her  a couple of months ago but she does feel she is pulling out of it.  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  She has a [employer] [place].  She continues on the Lexapro.  We discussed that if indeed over the next couple of months she feels that she is not able to more so pull out of her depression, we might add Wellbutrin which she has never taken before but I am confident she is doing well.  She needs refill of all medications.  We discussed coming in in a couple of months to update her labs and do her physical and we can check on her depression    She did actually have a normal colonoscopy with Dr. Bansal 2021.  She was told come back in 10 years     she is walking her dog every day.  She has only gained 5 lb.    Blood pressure running well at home.  MCV slightly up.  Will check B12 but may represent the alcohol  intake.    CONST: weight stable. EYES: no vision change. ENT: no sore throat. CV: no chest pain w/ exertion. RESP: no shortness of breath. GI: no nausea, vomiting, diarrhea. No dysphagia. : no urinary issues. MUSCULOSKELETAL: no new myalgias or arthralgias. SKIN: no new changes. NEURO: no focal deficits. PSYCH: no new issues. ENDOCRINE: no polyuria. HEME: no lymph nodes. ALLERGY: no general pruritis.     PAST MEDICAL HISTORY:    1. Anxiety/insomnia -Lexapro, occasional use of Ambien  2.  Hypertension formally diagnosed   3. [facility]   4.  Nail dermatophytosis    5.  Isolated ALT elevation resolved - fatty liver    6. Normal colonoscopy 2021, 10 years, Dr. Bansal                                                                                                                      PAST SURGICAL HISTORY:  None.                                                                                                                             SOCIAL HISTORY:  . Former smoker.  Quit in  and smoked for 10 years        prior.  She drinks alcohol on the weekends.                                                                                                               FAMILY HISTORY:  Mom  with no problems.  Father  last year at 76    of lung cancer and was a nonsmoker but was a book keeper in [place].    One sister in 40s in good health.        Gen: no distress    Paula was seen today for anxiety.    Diagnoses and all orders for this visit:    Anxiety disorder, unspecified type, chronic and stable with some worsening as would be expected due to grief and bereavement.  Appears to be improving, continue Lexapro    Mood disorder    Insomnia, unspecified type, currently not taking any Ambien when she has a few drinks of alcohol.  She is trying to cut down on the alcohol and reassured her she can use a small dose of Ambien even with moderate alcohol just to be careful of side  effects    Bereavement    Essential hypertension, chronic and stable  -     hydroCHLOROthiazide (HYDRODIURIL) 12.5 MG Tab; Take 1 tablet (12.5 mg total) by mouth once daily.    Anxiety  -     EScitalopram oxalate (LEXAPRO) 20 MG tablet; Take 1 tablet (20 mg total) by mouth once daily.    Other orders  -     valsartan (DIOVAN) 160 MG tablet; Take 1 tablet (160 mg total) by mouth once daily.  -     zolpidem (AMBIEN) 10 mg Tab; Take 1 tablet (10 mg total) by mouth nightly as needed.        Oriented - self; Oriented - place; Oriented - time

## 2022-08-01 DIAGNOSIS — I10 ESSENTIAL HYPERTENSION: ICD-10-CM

## 2022-08-01 RX ORDER — HYDROCHLOROTHIAZIDE 12.5 MG/1
TABLET ORAL
Qty: 90 TABLET | Refills: 0 | OUTPATIENT
Start: 2022-08-01

## 2022-08-01 NOTE — TELEPHONE ENCOUNTER
Care Due:                  Date            Visit Type   Department     Provider  --------------------------------------------------------------------------------                                MYCHART                              ANNUAL       [facility]                              CHECKUP/PHY  MED/ INTERNAL  Rex Robbins  Last Visit: 07-      S            MED/ PEDS      [place]  Next Visit: None Scheduled  None         None Found                                                            Last  Test          Frequency    Reason                     Performed    Due Date  --------------------------------------------------------------------------------    CMP.........  12 months..  hydroCHLOROthiazide,       08- 07-                             valsartan................    [employer] Embedded Care Gaps. Reference number: [id number]. 8/01/2022   6:37:08 PM CDT

## 2022-08-02 ENCOUNTER — PATIENT MESSAGE (OUTPATIENT)
Dept: FAMILY MEDICINE | Facility: CLINIC | Age: 52
End: 2022-08-02
Payer: COMMERCIAL

## 2022-08-02 NOTE — TELEPHONE ENCOUNTER
Refill Decision Note   Paula Rodriguez  is requesting a refill authorization.  Brief Assessment and Rationale for Refill:  Quick Discontinue    -Medication-Related Problems Identified: Requires labs  Medication Therapy Plan:       Medication Reconciliation Completed: No   Comments:     Provider Staff:     Action is required for this patient.   Please see care gap opportunities below in Care Due Message.     Thanks!  [facility]     Appointments      Date Provider   Last Visit   7/28/2022 Rex Matos MD   Next Visit   Visit date not found Rex Matos MD     Note composed:8:36 PM 08/01/2022           Note composed:8:36 PM 08/01/2022

## 2022-08-03 DIAGNOSIS — F41.9 ANXIETY: ICD-10-CM

## 2022-08-03 DIAGNOSIS — I10 ESSENTIAL HYPERTENSION: ICD-10-CM

## 2022-08-03 RX ORDER — HYDROCHLOROTHIAZIDE 12.5 MG/1
TABLET ORAL
Qty: 90 TABLET | Refills: 0 | OUTPATIENT
Start: 2022-08-03

## 2022-08-03 RX ORDER — ESCITALOPRAM OXALATE 20 MG/1
TABLET ORAL
Qty: 90 TABLET | Refills: 0 | OUTPATIENT
Start: 2022-08-03

## 2022-08-03 NOTE — TELEPHONE ENCOUNTER
No new care gaps identified.  [facility] Embedded Care Gaps. Reference number: [id number]. 8/03/2022   9:11:28 AM JERRIT

## 2022-08-03 NOTE — TELEPHONE ENCOUNTER
Refill Decision Note   Paula Michael  is requesting a refill authorization.  Brief Assessment and Rationale for Refill:  Quick Discontinue     Medication Therapy Plan:       Medication Reconciliation Completed: No   Comments:     No Care Gaps recommended.     Note composed:3:49 PM 08/03/2022

## 2022-09-07 DIAGNOSIS — I10 ESSENTIAL HYPERTENSION: ICD-10-CM

## 2022-10-05 DIAGNOSIS — I10 ESSENTIAL HYPERTENSION: ICD-10-CM

## 2023-01-31 ENCOUNTER — PATIENT MESSAGE (OUTPATIENT)
Dept: FAMILY MEDICINE | Facility: CLINIC | Age: 53
End: 2023-01-31
Payer: COMMERCIAL

## 2023-02-07 ENCOUNTER — PATIENT OUTREACH (OUTPATIENT)
Dept: ADMINISTRATIVE | Facility: HOSPITAL | Age: 53
End: 2023-02-07
Payer: COMMERCIAL

## 2023-02-08 ENCOUNTER — TELEPHONE (OUTPATIENT)
Dept: PSYCHIATRY | Facility: CLINIC | Age: 53
End: 2023-02-08
Payer: COMMERCIAL

## 2023-02-08 NOTE — TELEPHONE ENCOUNTER
Pt called, lvm to make NP appt for Rx Management- ref from Dr. Matos. Called returned, appt made enrique/ Juni for 2/16 @ 11am. Made pt aware we do not rx any benzos, stimulants, or her Ambien. Pt verbally acknowledged. No concerns voiced.

## 2023-02-16 ENCOUNTER — OFFICE VISIT (OUTPATIENT)
Dept: PSYCHIATRY | Facility: CLINIC | Age: 53
End: 2023-02-16
Payer: COMMERCIAL

## 2023-02-16 VITALS
HEART RATE: 102 BPM | HEIGHT: 69 IN | SYSTOLIC BLOOD PRESSURE: 156 MMHG | OXYGEN SATURATION: 97 % | WEIGHT: 168.19 LBS | BODY MASS INDEX: 24.91 KG/M2 | DIASTOLIC BLOOD PRESSURE: 78 MMHG

## 2023-02-16 DIAGNOSIS — F43.23 ADJUSTMENT DISORDER WITH MIXED ANXIETY AND DEPRESSED MOOD: Primary | ICD-10-CM

## 2023-02-16 PROCEDURE — 1160F RVW MEDS BY RX/DR IN RCRD: CPT | Mod: CPTII,S$GLB,,

## 2023-02-16 PROCEDURE — 99999 PR PBB SHADOW E&M-EST. PATIENT-LVL IV: CPT | Mod: PBBFAC,,,

## 2023-02-16 PROCEDURE — 1159F PR MEDICATION LIST DOCUMENTED IN MEDICAL RECORD: ICD-10-PCS | Mod: CPTII,S$GLB,,

## 2023-02-16 PROCEDURE — 3008F PR BODY MASS INDEX (BMI) DOCUMENTED: ICD-10-PCS | Mod: CPTII,S$GLB,,

## 2023-02-16 PROCEDURE — 90833 PSYTX W PT W E/M 30 MIN: CPT | Mod: S$GLB,,,

## 2023-02-16 PROCEDURE — 1159F MED LIST DOCD IN RCRD: CPT | Mod: CPTII,S$GLB,,

## 2023-02-16 PROCEDURE — 3078F DIAST BP <80 MM HG: CPT | Mod: CPTII,S$GLB,,

## 2023-02-16 PROCEDURE — 99999 PR PBB SHADOW E&M-EST. PATIENT-LVL IV: ICD-10-PCS | Mod: PBBFAC,,,

## 2023-02-16 PROCEDURE — 90833 PR PSYCHOTHERAPY W/PATIENT W/E&M, 30 MIN (ADD ON): ICD-10-PCS | Mod: S$GLB,,,

## 2023-02-16 PROCEDURE — 99204 OFFICE O/P NEW MOD 45 MIN: CPT | Mod: S$GLB,,,

## 2023-02-16 PROCEDURE — 3078F PR MOST RECENT DIASTOLIC BLOOD PRESSURE < 80 MM HG: ICD-10-PCS | Mod: CPTII,S$GLB,,

## 2023-02-16 PROCEDURE — 1160F PR REVIEW ALL MEDS BY PRESCRIBER/CLIN PHARMACIST DOCUMENTED: ICD-10-PCS | Mod: CPTII,S$GLB,,

## 2023-02-16 PROCEDURE — 3077F SYST BP >= 140 MM HG: CPT | Mod: CPTII,S$GLB,,

## 2023-02-16 PROCEDURE — 99204 PR OFFICE/OUTPT VISIT, NEW, LEVL IV, 45-59 MIN: ICD-10-PCS | Mod: S$GLB,,,

## 2023-02-16 PROCEDURE — 3077F PR MOST RECENT SYSTOLIC BLOOD PRESSURE >= 140 MM HG: ICD-10-PCS | Mod: CPTII,S$GLB,,

## 2023-02-16 PROCEDURE — 3008F BODY MASS INDEX DOCD: CPT | Mod: CPTII,S$GLB,,

## 2023-02-16 RX ORDER — TRAZODONE HYDROCHLORIDE 50 MG/1
50 TABLET ORAL NIGHTLY
Qty: 30 TABLET | Refills: 0 | Status: SHIPPED | OUTPATIENT
Start: 2023-02-16 | End: 2023-05-03 | Stop reason: SDUPTHER

## 2023-02-16 RX ORDER — CITALOPRAM 10 MG/1
TABLET ORAL
Qty: 46 TABLET | Refills: 0 | Status: SHIPPED | OUTPATIENT
Start: 2023-02-16 | End: 2023-03-02

## 2023-02-16 RX ORDER — HYDROXYZINE PAMOATE 25 MG/1
25 CAPSULE ORAL 4 TIMES DAILY
Qty: 60 CAPSULE | Refills: 0 | Status: SHIPPED | OUTPATIENT
Start: 2023-02-16 | End: 2023-05-03

## 2023-02-16 NOTE — PROGRESS NOTES
"Outpatient Psychiatry Initial Visit   2023    Paula Rodriguez, a 52 y.o. female, presenting for initial evaluation visit. Met with patient.    Reason for Encounter: Referral from Dr. Matos . Patient complains of depression      History of Present Illness:    SUBJECTIVE:   Psych Interview 2023:   Paula Rodriguez is a 52 y.o. female with past psychiatric history of depression  presented to for initial evaluation and treatment for depression and anxiety.    Pt is A+Ox 4.  Patients mood is "not good", affect appears guarded, sad, anxious. Pts thought process is normal and logical.  Pts speech is normal tone, normal rate, normal pitch, normal volume   Linear and logical, cooperative, poor eye contact, psychomotor retardation.  Pt is tearful during interview.  Pt is casually dressed and well groomed.      States that she has been taking lexapro for 15 years for depression.  States that she was well controlled until her  unexpectedly  in 2022.  States that she has not been able to grieve his passing.  States that she is from [facility] and all of her family lives there.  States that she does have local friends who she can talk to.  States that she has looked into group therapy, but was unsuccessful.  States that she is interested in talk therapy at this time.  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  States that she enjoys her work but works 3 days a week from home.  States that being home is not good for her mental health and she has been going on walks with her dog to get out the house.      Denies prior hx of psychiatric hospitalizations. Denies hx of suicide attempts. Pt denies hx self harm. Pt denies hx hallucinations.  Pt denies hx of eating disorders.   Pt denies hx trauma. Denies physical/sexual abuse. Pt denies symptoms including nightmares, hypervigilance, flashbacks, avoidance behaviors, and disassociation.    Reports depression today as 6/10, and " anxiety as 4/10.  Reports sleeping 8 hrs per night, and poor appetite. States that she consumes 5 drinks every night to go to sleep.  State sthat she wants to stop drinks.  States that she has used drinking to cope with her husbands passing.  Lost 20lbs over this last year.    Denies SI/HI/AVH. Denies side effects of medications.    Pt states that there support consists of friends and coworkers.      Denies recreational drug use. Pt reports 35 drinks per week, 0.5ppd tobacco use, denies Vaping, 2 cups of coffee Caffeine.  CAGE 2/4.      Current Medication:  Lexapro 20mg daily  Ambien 10mg nightly      DX:  The patient complained of depressed mood with lethargy, decreased appetite , insomnia, psycho-motor retardation, anhedonia, apathy, worsening self-esteem, guilt, decreased concentration and ability to make decisions, and thoughts of suicide.     Pt denies hx symptoms/episodes of cathleen.    Admits to symptoms of anxiety including excessive anxiety/worry/fear, more days than not, about numerous issues, difficulty controlling the worry, over thinking, rumination, restlessness, poor concentration, fatigue, and increased irritability. Denies panic attacks at this time.       Review Of Systems:     Review of Systems   Constitutional:  Negative for weight loss.   HENT:  Negative for tinnitus.    Eyes:  Negative for blurred vision.   Respiratory:  Negative for cough and shortness of breath.    Cardiovascular:  Negative for chest pain.   Gastrointestinal:  Negative for abdominal pain.   Genitourinary:  Negative for dysuria.   Musculoskeletal:  Negative for back pain and neck pain.   Skin:  Negative for rash.   Neurological:  Negative for dizziness, seizures and weakness.     Psychiatric Review Of Systems - Is patient experiencing or having changes in:  sleep: yes  appetite: yes  weight: yes  energy/anergy: yes  interest/pleasure/anhedonia: yes  somatic symptoms: no  libido: no  anxiety/panic: yes  guilty/hopelessness:  yes  concentration: no  S.I.B.s/risky behavior: no  Irritability: no  Racing thoughts: yes  Impulsive behaviors: no  Paranoia: no  AVH: no    Risk Parameters:  Patient reports no suicidal ideation  Patient reports no homicidal ideation  Patient reports no self-injurious behavior  Patient reports no violent behavior    OBJECTIVE     Past Psychiatric History:   Previous Psychiatric Hospitalizations: NO  Previous Medication Trials: YES:      History of psychotherapy:  NO  Previous Suicide Attempts: NO  History of Violence:  NO  History of physical/sexual abuse: NO  Outpatient psychiatric provider(past): NO    Substance Abuse History:   Tobacco: NO  Alcohol: YES:      Illicit Substances: NO  Detox/Rehab: NO    Neurological History:   Seizures: NO  Head trauma: NO    Family Psychiatric History: Yes -   Father - depression    Social History:  Developmental/Childhood:Achieved all developmental milestones timely  *Education:Bachelor's Degree  Employment Status/Finances:Employed   Relationship Status/Sexual Orientation:    Children: 0  Housing Status: Home    history:  NO  Access to gun: YES:      Oriental orthodox: agnostic  Recreational activities: gardening   Person patient is closest to/confides in: friends    Legal History:   Past Charges/Incarcerations:  No      Past Medical/Surgical History:   Past Medical History:   Diagnosis Date    Anxiety disorder 9/6/2013    Hypertension     Insomnia 9/6/2013     No past surgical history on file.      Current Medications:   Medication List with Changes/Refills   New Medications    CITALOPRAM (CELEXA) 10 MG TABLET    Take 1 tablet (10 mg total) by mouth once daily for 14 days, THEN 2 tablets (20 mg total) once daily for 16 days.    HYDROXYZINE PAMOATE (VISTARIL) 25 MG CAP    Take 1 capsule (25 mg total) by mouth 4 (four) times daily.    TRAZODONE (DESYREL) 50 MG TABLET    Take 1 tablet (50 mg total) by mouth every evening.   Current Medications    HYDROCHLOROTHIAZIDE  "(HYDRODIURIL) 12.5 MG TAB    Take 1 tablet (12.5 mg total) by mouth once daily.    VALSARTAN (DIOVAN) 160 MG TABLET    Take 1 tablet (160 mg total) by mouth once daily.    ZOLPIDEM (AMBIEN) 10 MG TAB    Take 1 tablet (10 mg total) by mouth nightly as needed.   Discontinued Medications    ESCITALOPRAM OXALATE (LEXAPRO) 20 MG TABLET    Take 1 tablet (20 mg total) by mouth once daily.       Allergies:   Review of patient's allergies indicates:  No Known Allergies      Vitals   Vitals:    02/16/23 1243   BP: (!) 156/78   Pulse: 102        Labs/Imaging/Studies:   No results found for this or any previous visit (from the past 48 hour(s)).   No results found for: PHENYTOIN, PHENOBARB, VALPROATE, CBMZ      Nutritional Screening: Considering the patient's height and weight, medications, medical history and preferences, should a referral be made to the dietitian? no    Constitutional  Vitals:  Most recent vital signs, dated less than 90 days prior to this appointment, were reviewed.    Vitals:    02/16/23 1047 02/16/23 1050 02/16/23 1243   BP: (!) 173/117 (!) 174/77 (!) 156/78   Pulse: (!) 113 (!) 128 102   SpO2: 97% 97%    Weight: 76.3 kg (168 lb 3.4 oz)     Height: 5' 9" (1.753 m) 5' 9" (1.753 m)         General:  unremarkable, age appropriate, casually dressed, neatly groomed     Musculoskeletal  Muscle Strength/Tone:  no spasicity, no rigidity, no cogwheeling, no flaccidity, no paratonia, no dyskinesia, no dystonia, no tremor, no tic, no choreoathetosis, no atrophy   Gait & Station:  non-ataxic       Psychiatric Mental Status Exam:  Arousal: alert  Sensorium/Orientation: oriented to grossly intact, person, place, situation, time/date  Behavior/Cooperation: cooperative, eye contact minimal   Speech: normal tone, normal rate, normal pitch, normal volume  Language: grossly intact, able to name  Mood: anxious, sad  Affect: blunted, guarded, anxious  Thought Process: normal and logical  Thought Content: concerned with getting " "better  Auditory hallucinations: NO  Visual hallucinations: NO  Paranoia: NO  Delusions:  NO  Suicidal ideation: NO  Homicidal ideation: NO  Attention/Concentration:  spelled "WORLD" forwards and backwards  Memory:    Recent: [facility] Recent Memory: WNL , 3 out of 3 in 3 minutes  Remote: [facility] Remote Memory: WNL , past events, as relates history  Fund of Knowledge: Aware of current events, Intact, and Vocabulary appropriate    Intelligence: [facility] Intelligence: Average, based on history, based on vocabulary, syntax, grammar and content  Insight: {[facility] insight: Fair, understanding severity of illness/history of present illness  Judgment: [facility] Judgement: Fair, per patient's behavior/history of present illness      Relevant Elements of Neurological Exam: normal gait        Laboratory Data  No visits with results within 1 Month(s) from this visit.   Latest known visit with results is:   Lab Visit on 08/03/2021   Component Date Value Ref Range Status    Sodium 08/03/2021 140  136 - 145 mmol/L Final    Potassium 08/03/2021 4.5  3.5 - 5.1 mmol/L Final    Chloride 08/03/2021 103  95 - 110 mmol/L Final    CO2 08/03/2021 27  23 - 29 mmol/L Final    Glucose 08/03/2021 94  70 - 110 mg/dL Final    BUN 08/03/2021 14  6 - 20 mg/dL Final    Creatinine 08/03/2021 0.9  0.5 - 1.4 mg/dL Final    Calcium 08/03/2021 9.8  8.7 - 10.5 mg/dL Final    Total Protein 08/03/2021 7.6  6.0 - 8.4 g/dL Final    Albumin 08/03/2021 4.5  3.5 - 5.2 g/dL Final    Total Bilirubin 08/03/2021 0.5  0.1 - 1.0 mg/dL Final    Alkaline Phosphatase 08/03/2021 77  55 - 135 U/L Final    AST 08/03/2021 26  10 - 40 U/L Final    ALT 08/03/2021 30  10 - 44 U/L Final    Anion Gap 08/03/2021 10  8 - 16 mmol/L Final    eGFR if African American 08/03/2021 >60  >60 mL/min/1.73 m^2 Final    eGFR if non African American 08/03/2021 >60  >60 mL/min/1.73 m^2 Final    WBC 08/03/2021 4.80  3.90 - 12.70 K/uL Final    RBC 08/03/2021 4.43  4.00 - 5.40 M/uL Final    Hemoglobin 08/03/2021 " 14.0  12.0 - 16.0 g/dL Final    Hematocrit 08/03/2021 41.9  37.0 - 48.5 % Final    MCV 08/03/2021 95  82 - 98 fL Final    MCH 08/03/2021 31.6 (H)  27.0 - 31.0 pg Final    MCHC 08/03/2021 33.4  32.0 - 36.0 g/dL Final    RDW 08/03/2021 11.9  11.5 - 14.5 % Final    Platelets 08/03/2021 253  150 - 450 K/uL Final    MPV 08/03/2021 10.5  9.2 - 12.9 fL Final    Immature Granulocytes 08/03/2021 0.2  0.0 - 0.5 % Final    Gran # (ANC) 08/03/2021 3.0  1.8 - 7.7 K/uL Final    Immature Grans (Abs) 08/03/2021 0.01  0.00 - 0.04 K/uL Final    Lymph # 08/03/2021 1.3  1.0 - 4.8 K/uL Final    Mono # 08/03/2021 0.4  0.3 - 1.0 K/uL Final    Eos # 08/03/2021 0.1  0.0 - 0.5 K/uL Final    Baso # 08/03/2021 0.03  0.00 - 0.20 K/uL Final    nRBC 08/03/2021 0  0 /100 WBC Final    Gran % 08/03/2021 62.4  38.0 - 73.0 % Final    Lymph % 08/03/2021 26.5  18.0 - 48.0 % Final    Mono % 08/03/2021 9.0  4.0 - 15.0 % Final    Eosinophil % 08/03/2021 1.3  0.0 - 8.0 % Final    Basophil % 08/03/2021 0.6  0.0 - 1.9 % Final    Differential Method 08/03/2021 Automated   Final    Vitamin B-12 08/03/2021 354  210 - 950 pg/mL Final    TSH 08/03/2021 1.673  0.400 - 4.000 uIU/mL Final    Cholesterol 08/03/2021 229 (H)  120 - 199 mg/dL Final    Triglycerides 08/03/2021 68  30 - 150 mg/dL Final    HDL 08/03/2021 93 (H)  40 - 75 mg/dL Final    LDL Cholesterol 08/03/2021 122.4  63.0 - 159.0 mg/dL Final    HDL/Cholesterol Ratio 08/03/2021 40.6  20.0 - 50.0 % Final    Total Cholesterol/HDL Ratio 08/03/2021 2.5  2.0 - 5.0 Final    Non-HDL Cholesterol 08/03/2021 136  mg/dL Final    Hemoglobin A1C 08/03/2021 5.0  4.0 - 5.6 % Final    Estimated Avg Glucose 08/03/2021 97  68 - 131 mg/dL Final         Medications  Outpatient Encounter Medications as of 2/16/2023   Medication Sig Dispense Refill    citalopram (CELEXA) 10 MG tablet Take 1 tablet (10 mg total) by mouth once daily for 14 days, THEN 2 tablets (20 mg total) once daily for 16 days. 46 tablet 0     hydroCHLOROthiazide (HYDRODIURIL) 12.5 MG Tab Take 1 tablet (12.5 mg total) by mouth once daily. 90 tablet 3    hydrOXYzine pamoate (VISTARIL) 25 MG Cap Take 1 capsule (25 mg total) by mouth 4 (four) times daily. 60 capsule 0    traZODone (DESYREL) 50 MG tablet Take 1 tablet (50 mg total) by mouth every evening. 30 tablet 0    valsartan (DIOVAN) 160 MG tablet Take 1 tablet (160 mg total) by mouth once daily. 90 tablet 3    zolpidem (AMBIEN) 10 mg Tab Take 1 tablet (10 mg total) by mouth nightly as needed. 30 tablet 5    [DISCONTINUED] EScitalopram oxalate (LEXAPRO) 20 MG tablet Take 1 tablet (20 mg total) by mouth once daily. 90 tablet 3     No facility-administered encounter medications on file as of 2/16/2023.           Assessment / Plan:     Diagnosis:      ICD-10-CM ICD-9-CM   1. Adjustment disorder with mixed anxiety and depressed mood  F43.23 309.28       Strengths and Liabilities: Strength: Patient accepts guidance/feedback, Strength: Patient is expressive/articulate., Strength: Patient is intelligent., Strength: Patient is motivated for change., Liability: Patient lacks coping skills.    Treatment Goals:  Specify outcomes written in observable, behavioral terms:   Anxiety: acquiring relapse prevention skills, reducing negative automatic thoughts, reducing physical symptoms of anxiety, and reducing time spent worrying (<30 minutes/day)  Depression: acquiring relapse prevention skills, increasing interest in usual activities, increasing self-reward for positive behaviors (one/day), increasing self-reward for positive thoughts (one/day), increasing social contacts (three/week), reducing excessive guilt, reducing fatigue, and reducing negative automatic thoughts    Treatment Plan/Recommendations:     Mood   Cross taper from Lexapro to Celexa    Week 1-2  Decrease Lexapro 10mg daily  Start Celexa 10mg daily   Week 3-4  Stop Lexapro   Increase Celexa 20mg daily     Start Trazodone 50mg QHS  Start Vistaril 25mg Q6  PRN anxiety    Pt instructed to Follow up with Dr. Matos concerning high BP.  States that she monitors her BP at home and it runs normal.  States that she gets nervous during doctor appointments.  States that she took her BP meds this AM.     In clinic Pt denies HA, SOB, Leg swelling, leg pain, CP, changes in vision     Pt this time I believe the Pt would benefit from an IOP.  [facility] Pamphlet was give in clinic. Pt instructed to call the number and make an appointment.     At this time Pt does not meet PEC criteria.  Denies SI/HI/AVH, not gravely disabled.  Pt extensively informed to call 911 or go to your nearest ED if you experience thoughts of hurting yourself.  Pt is amendable to plan.     Discussed diagnosis, risks and benefits of proposed treatment above vs alternative treatments vs no treatment, and potential side effects of these treatments, and the inherent unpredictability of individual response to treatment.  The patient expresses understanding and gives informed consent to pursue treatment.  The potential benefits outweigh the potential risks. Patient has no other questions. Risks/adverse effects discussed at this time including but not limited to: GI side effects, sexual dysfunction, activation vs sedation, triggering of suicidal thoughts, and serotonin syndrome.    Serotonin syndrome   Mental status changes can include anxiety, restlessness, disorientation, and agitated delirium.    Autonomic manifestations can include diaphoresis, tachycardia, hyperthermia, hypertension, vomiting, and diarrhea   Neuromuscular hyperactivity can manifest as tremor, myoclonus, hyperreflexia, rigidity, hyperthermia, seizure, and bilateral Babinski sign.   Pt was informed that if they experience any of these symptoms to go the ED.       Difficulty Sleeping Behavioral Modification:  Implement stimulus control: [place] bedroom for sleep only. Leave bedroom when frustrated from not sleeping.  Engage in relaxation before returning. Engage in activities during the day. AVOID >7-8 h time in bed  Avoid clock watching  Avoid thinking/worrying about sleep when trying to fall asleep  Limit caffeinated consumption  Make sure the bedroom is dark, quiet and cool    Safety Plan   Patient voices understanding and agreement with this plan  Provided crisis numbers  Encouraged patient to keep future appointments.  Instructed patient to call or message with questions or concerns  In the event of an emergency, including suicidal ideation, patient was advised to go to the emergency room and/or call 911    Return to Clinic: 2 weeks    Psychotherapy:  Target symptoms: depression, anxiety , adjustment  Why chosen therapy is appropriate versus another modality: relevant to diagnosis, evidence based practice  Outcome monitoring methods: self-report, observation  Therapeutic intervention type: insight oriented psychotherapy, supportive psychotherapy  Topics discussed/themes: relationships difficulties, difficulty managing affect in interpersonal relationships, building skills sets for symptom management, symptom recognition  The patient's response to the intervention is guarded. The patient's progress toward treatment goals is fair.   Duration of intervention: 20 minutes.    Total face to face time: 60 min  Total time (chart review, patient contact, documentation): 69 min    Eron Amaya PA-C      *This note has been prepared using a combination of a dictation device and typing.  It has been checked for errors but some errors may still exist within the note as a result of speech recognition errors and/or typographical errors.

## 2023-02-20 ENCOUNTER — TELEPHONE (OUTPATIENT)
Dept: PSYCHIATRY | Facility: CLINIC | Age: 53
End: 2023-02-20
Payer: COMMERCIAL

## 2023-02-20 NOTE — TELEPHONE ENCOUNTER
Pt called, lvm to make f/u appt w/ Juni & appt w/ Latisha. Call returned, appts made w/ pt. No concerns voiced.

## 2023-03-02 ENCOUNTER — OFFICE VISIT (OUTPATIENT)
Dept: PSYCHIATRY | Facility: CLINIC | Age: 53
End: 2023-03-02
Payer: COMMERCIAL

## 2023-03-02 VITALS
WEIGHT: 167.75 LBS | SYSTOLIC BLOOD PRESSURE: 144 MMHG | HEIGHT: 69 IN | BODY MASS INDEX: 24.85 KG/M2 | HEART RATE: 110 BPM | DIASTOLIC BLOOD PRESSURE: 94 MMHG | OXYGEN SATURATION: 98 %

## 2023-03-02 DIAGNOSIS — F43.23 ADJUSTMENT DISORDER WITH MIXED ANXIETY AND DEPRESSED MOOD: Primary | ICD-10-CM

## 2023-03-02 PROCEDURE — 99999 PR PBB SHADOW E&M-EST. PATIENT-LVL III: ICD-10-PCS | Mod: PBBFAC,,,

## 2023-03-02 PROCEDURE — 3077F PR MOST RECENT SYSTOLIC BLOOD PRESSURE >= 140 MM HG: ICD-10-PCS | Mod: CPTII,S$GLB,,

## 2023-03-02 PROCEDURE — 99999 PR PBB SHADOW E&M-EST. PATIENT-LVL III: CPT | Mod: PBBFAC,,,

## 2023-03-02 PROCEDURE — 3080F DIAST BP >= 90 MM HG: CPT | Mod: CPTII,S$GLB,,

## 2023-03-02 PROCEDURE — 1159F MED LIST DOCD IN RCRD: CPT | Mod: CPTII,S$GLB,,

## 2023-03-02 PROCEDURE — 99214 PR OFFICE/OUTPT VISIT, EST, LEVL IV, 30-39 MIN: ICD-10-PCS | Mod: S$GLB,,,

## 2023-03-02 PROCEDURE — 3008F PR BODY MASS INDEX (BMI) DOCUMENTED: ICD-10-PCS | Mod: CPTII,S$GLB,,

## 2023-03-02 PROCEDURE — 90833 PR PSYCHOTHERAPY W/PATIENT W/E&M, 30 MIN (ADD ON): ICD-10-PCS | Mod: S$GLB,,,

## 2023-03-02 PROCEDURE — 1159F PR MEDICATION LIST DOCUMENTED IN MEDICAL RECORD: ICD-10-PCS | Mod: CPTII,S$GLB,,

## 2023-03-02 PROCEDURE — 3008F BODY MASS INDEX DOCD: CPT | Mod: CPTII,S$GLB,,

## 2023-03-02 PROCEDURE — 3077F SYST BP >= 140 MM HG: CPT | Mod: CPTII,S$GLB,,

## 2023-03-02 PROCEDURE — 90833 PSYTX W PT W E/M 30 MIN: CPT | Mod: S$GLB,,,

## 2023-03-02 PROCEDURE — 3080F PR MOST RECENT DIASTOLIC BLOOD PRESSURE >= 90 MM HG: ICD-10-PCS | Mod: CPTII,S$GLB,,

## 2023-03-02 PROCEDURE — 99214 OFFICE O/P EST MOD 30 MIN: CPT | Mod: S$GLB,,,

## 2023-03-02 PROCEDURE — 1160F PR REVIEW ALL MEDS BY PRESCRIBER/CLIN PHARMACIST DOCUMENTED: ICD-10-PCS | Mod: CPTII,S$GLB,,

## 2023-03-02 PROCEDURE — 1160F RVW MEDS BY RX/DR IN RCRD: CPT | Mod: CPTII,S$GLB,,

## 2023-03-02 RX ORDER — CITALOPRAM 20 MG/1
20 TABLET, FILM COATED ORAL DAILY
Qty: 30 TABLET | Refills: 1 | Status: SHIPPED | OUTPATIENT
Start: 2023-03-02 | End: 2023-04-03

## 2023-03-02 NOTE — PROGRESS NOTES
"Outpatient Psychiatry Follow-Up Visit   3/2/2023    Clinical Status of Patient:  Outpatient (Ambulatory)    Chief Complaint:  Paula Rodriguez is a 52 y.o. female who presents today for follow-up of depression and anxiety.  Met with patient.      Interval History and Content of Current Session 03/02/2023:    Pt is A+Ox 4.  Patients mood is "better", affect appears appropriate, sad. Pts thought process is normal and logical.  Pts speech is normal tone, normal rate, normal pitch, normal volume   Linear and logical, friendly and cooperative, good eye contact, no psychomotor retardation.  Pt is calmly seated in chair during interview. Pt is casually dressed and well groomed.  Pt is not tearful during interview    Patient states that her anxiety and depression improved since starting Celexa, patient is currently taking Celexa 10mg daily. Pt states that she has cut back on her drinking, currently reports drinking 14 beers a week. Patient states that she has stopped drinking hard liquor. During the last two weeks Pt reports hanging out with friends which has help distract her. Patient also states that she has begun gardening again.    Reports depression today as 4/10, and anxiety as 2/10.    Pt reports taking medications as prescribed and behaving appropriately during interview today.  Denies SI/HI/AVH. Denies side effects of medications.  Pt reports sleeping well and normal appetite.     Denies recreational drug use. Pt reports 14 drinks per week, 0.5ppd tobacco use, denies Vaping, 2 cups of coffee Caffeine.  CAGE 2/4.        Prior visit :  Psych Interview 02/16/2023:   Paula Rodriguez is a 52 y.o. female with past psychiatric history of depression  presented to for initial evaluation and treatment for depression and anxiety.     Pt is A+Ox 4.  Patients mood is "not good", affect appears guarded, sad, anxious. Pts thought process is normal and logical.  Pts speech is normal tone, normal rate, normal pitch, normal volume   " Linear and logical, cooperative, poor eye contact, psychomotor retardation.  Pt is tearful during interview.  Pt is casually dressed and well groomed.       States that she has been taking lexapro for 15 years for depression.  States that she was well controlled until her  unexpectedly  in 2022.  States that she has not been able to grieve his passing.  States that she is from [place] and all of her family lives there.  States that she does have local friends who she can talk to.  States that she has looked into group therapy, but was unsuccessful.  States that she is interested in talk therapy at this time.  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  States that she enjoys her work but works 3 days a week from home.  States that being home is not good for her mental health and she has been going on walks with her dog to get out the house.       Denies prior hx of psychiatric hospitalizations. Denies hx of suicide attempts. Pt denies hx self harm. Pt denies hx hallucinations.  Pt denies hx of eating disorders.   Pt denies hx trauma. Denies physical/sexual abuse. Pt denies symptoms including nightmares, hypervigilance, flashbacks, avoidance behaviors, and disassociation.     Reports depression today as 6/10, and anxiety as 4/10.  Reports sleeping 8 hrs per night, and poor appetite. States that she consumes 5 drinks every night to go to sleep.  State sthat she wants to stop drinks.  States that she has used drinking to cope with her husbands passing.  Lost 20lbs over this last year.    Denies SI/HI/AVH. Denies side effects of medications.     Pt states that there support consists of friends and coworkers.       Denies recreational drug use. Pt reports 35 drinks per week, 0.5ppd tobacco use, denies Vaping, 2 cups of coffee Caffeine.  CAGE 2/4.       Current Medication:  Lexapro 20mg daily  Ambien 10mg nightly        DX:  The patient complained of depressed mood with  lethargy, decreased appetite , insomnia, psycho-motor retardation, anhedonia, apathy, worsening self-esteem, guilt, decreased concentration and ability to make decisions, and thoughts of suicide.      Pt denies hx symptoms/episodes of cathleen.     Admits to symptoms of anxiety including excessive anxiety/worry/fear, more days than not, about numerous issues, difficulty controlling the worry, over thinking, rumination, restlessness, poor concentration, fatigue, and increased irritability. Denies panic attacks at this time.     Past Psychiatric History:   Previous Psychiatric Hospitalizations: NO  Previous Medication Trials: YES:      History of psychotherapy:  NO  Previous Suicide Attempts: NO  History of Violence:  NO  History of physical/sexual abuse: NO  Outpatient psychiatric provider(past): NO     Substance Abuse History:   Tobacco: NO  Alcohol: YES:      Illicit Substances: NO  Detox/Rehab: NO     Neurological History:   Seizures: NO  Head trauma: NO     Family Psychiatric History: Yes -   Father - depression     Social History:  Developmental/Childhood:Achieved all developmental milestones timely  *Education:Bachelor's Degree  Employment Status/Finances:Employed   Relationship Status/Sexual Orientation:    Children: 0  Housing Status: Home    history:  NO  Access to gun: YES:      Scientologist: agnostic  Recreational activities: gardening   Person patient is closest to/confides in: friends     Legal History:   Past Charges/Incarcerations:  No      Review of Systems     Review of Systems   Constitutional:  Negative for weight loss.   HENT:  Negative for tinnitus.    Eyes:  Negative for blurred vision.   Respiratory:  Negative for cough and shortness of breath.    Cardiovascular:  Negative for chest pain.   Gastrointestinal:  Negative for abdominal pain.   Genitourinary:  Negative for dysuria.   Musculoskeletal:  Negative for back pain and neck pain.   Skin:  Negative for rash.   Neurological:  Negative  for dizziness, seizures and weakness.   Psychiatric/Behavioral:  Positive for depression. Negative for hallucinations, memory loss, substance abuse and suicidal ideas. The patient is nervous/anxious. The patient does not have insomnia.      Psychiatric Review Of Systems - Is patient experiencing or having changes in:  sleep: no  appetite: no  weight: no  energy/anergy: yes  interest/pleasure/anhedonia: yes  somatic symptoms: no  libido: no  anxiety/panic: yes  guilty/hopelessness: no  concentration: no  S.I.B.s/risky behavior: no  Irritability: no  Racing thoughts: no  Impulsive behaviors: no  Paranoia: no  AVH: no      Past Medical, Family and Social History: The patient's past medical, family and social history have been reviewed and updated as appropriate within the electronic medical record - see encounter notes.      Current Medications:   Medication List with Changes/Refills   New Medications    CITALOPRAM (CELEXA) 20 MG TABLET    Take 1 tablet (20 mg total) by mouth once daily.   Current Medications    HYDROCHLOROTHIAZIDE (HYDRODIURIL) 12.5 MG TAB    Take 1 tablet (12.5 mg total) by mouth once daily.    HYDROXYZINE PAMOATE (VISTARIL) 25 MG CAP    Take 1 capsule (25 mg total) by mouth 4 (four) times daily.    TRAZODONE (DESYREL) 50 MG TABLET    Take 1 tablet (50 mg total) by mouth every evening.    VALSARTAN (DIOVAN) 160 MG TABLET    Take 1 tablet (160 mg total) by mouth once daily.   Discontinued Medications    CITALOPRAM (CELEXA) 10 MG TABLET    Take 1 tablet (10 mg total) by mouth once daily for 14 days, THEN 2 tablets (20 mg total) once daily for 16 days.         Allergies:   Review of patient's allergies indicates:  No Known Allergies      Vitals   Vitals:    03/02/23 1318   BP: (!) 144/94   Pulse: 110          Labs/Imaging/Studies:   No results found for this or any previous visit (from the past 48 hour(s)).   No results found for: PHENYTOIN, PHENOBARB, VALPROATE, CBMZ    Compliance: yes    Side effects:  "None    Risk Parameters:  Patient reports no suicidal ideation  Patient reports no homicidal ideation  Patient reports no self-injurious behavior  Patient reports no violent behavior    Exam (detailed: at least 9 elements; comprehensive: all 15 elements)   Constitutional  Vitals:  Most recent vital signs, dated less than 90 days prior to this appointment, were reviewed.   Vitals:    03/02/23 1318   BP: (!) 144/94   Pulse: 110   SpO2: 98%   Weight: 76.1 kg (167 lb 12.3 oz)   Height: 5' 9" (1.753 m)        General:  unremarkable, age appropriate, casually dressed, neatly groomed     Musculoskeletal  Muscle Strength/Tone:  no spasicity, no rigidity, no cogwheeling, no flaccidity, no paratonia, no dyskinesia, no dystonia, no tremor, no tic, no choreoathetosis, no atrophy   Gait & Station:  non-ataxic     Psychiatric  Speech:  no latency; no press   Mood & Affect:  steady, anxious  appropriate, guarded, anxious   Thought Process:  normal and logical   Associations:  intact   Thought Content:  normal, no suicidality, no homicidality, delusions, or paranoia   Insight:  intact, has awareness of illness   Judgement: behavior is adequate to circumstances, age appropriate   Orientation:  grossly intact, person, place, situation, time/date   Memory: intact for content of interview, grossly intact, memory >3 objects at five mins   Language: grossly intact, able to name, able to repeat   Attention Span & Concentration:  able to focus, completed tasks   Fund of Knowledge:  intact and appropriate to age and level of education, familiar with aspects of current personal life     Assessment and Diagnosis   Status/Progress: Based on the examination today, the patient's problem(s) is/are resolving.  New problems have not been presented today.       General Impression:      ICD-10-CM ICD-9-CM   1. Adjustment disorder with mixed anxiety and depressed mood  F43.23 309.28       Intervention/Counseling/Treatment Plan   Mood  Increase Celexa " 20mg daily - anxiety and depression   Continue Vistaril 25mg Q6 PRN anxiety     Sleep  Continue Trazodone 50mg nightly- sleep    Referral for talk therapy placed.  Pt was extensively educated in the importance of making and keeping a talk therapy appointment. Appointment with Latisha 3/21/2023    Follow up with Dr. Matos concerning high BP   In clinic Pt denies HA, SOB, Leg swelling, leg pain, CP, changes in vision   Pt states that she took her BP before coming to clinic and it was 130/80.  Pt states that she believes her high BP is due to anxiety.      Discussed diagnosis, risks and benefits of proposed treatment above vs alternative treatments vs no treatment, and potential side effects of these treatments, and the inherent unpredictability of individual response to treatment.  The patient expresses understanding and gives informed consent to pursue treatment.  The potential benefits outweigh the potential risks. Patient has no other questions. Risks/adverse effects discussed at this time including but not limited to: GI side effects, sexual dysfunction, activation vs sedation, triggering of suicidal thoughts, and serotonin syndrome.    Serotonin syndrome   Mental status changes can include anxiety, restlessness, disorientation, and agitated delirium.    Autonomic manifestations can include diaphoresis, tachycardia, hyperthermia, hypertension, vomiting, and diarrhea   Neuromuscular hyperactivity can manifest as tremor, myoclonus, hyperreflexia, rigidity, hyperthermia, seizure, and bilateral Babinski sign.   Pt was informed that if they experience any of these symptoms to go the ED.     Difficulty Sleeping Behavioral Modification:  Implement stimulus control: Nathalie bedroom for sleep only. Leave bedroom when frustrated from not sleeping. Engage in relaxation before returning. Engage in activities during the day. AVOID >7-8 h time in bed  Avoid clock watching  Avoid thinking/worrying about sleep when trying to  fall asleep  Limit caffeinated consumption  Make sure the bedroom is dark, quiet and cool    Safety Plan   Patient voices understanding and agreement with this plan  Provided crisis numbers  Encouraged patient to keep future appointments.  Instructed patient to call or message with questions or concerns  In the event of an emergency, including suicidal ideation, patient was advised to go to the emergency room and/or call 911    Return to Clinic: 1 month    Psychotherapy:  Target symptoms: depression, anxiety   Why chosen therapy is appropriate versus another modality: relevant to diagnosis, evidence based practice  Outcome monitoring methods: self-report, observation  Therapeutic intervention type: insight oriented psychotherapy, supportive psychotherapy  Topics discussed/themes: illness/death of a loved one, difficulty managing affect in interpersonal relationships, building skills sets for symptom management, symptom recognition  The patient's response to the intervention is accepting. The patient's progress toward treatment goals is good.   Duration of intervention: 16 minutes.    Total face to face time: 30 min  Total time (chart review, patient contact, documentation): 45 min    A diagnostic psychiatric evaluation was performed and responsiveness to treatment was assessed.  The patient demonstrates robust ability/capacity to respond to treatment.    Eron Amaya PA-C    *This note has been prepared using a combination of a dictation device and typing.  It has been checked for errors but some errors may still exist within the note as a result of speech recognition errors and/or typographical errors.

## 2023-03-21 ENCOUNTER — OFFICE VISIT (OUTPATIENT)
Dept: PSYCHIATRY | Facility: CLINIC | Age: 53
End: 2023-03-21
Payer: COMMERCIAL

## 2023-03-21 DIAGNOSIS — F43.21 GRIEF: ICD-10-CM

## 2023-03-21 DIAGNOSIS — F43.23 ADJUSTMENT DISORDER WITH MIXED ANXIETY AND DEPRESSED MOOD: Primary | ICD-10-CM

## 2023-03-21 PROCEDURE — 90837 PR PSYCHOTHERAPY W/PATIENT, 60 MIN: ICD-10-PCS | Mod: S$GLB,,, | Performed by: SOCIAL WORKER

## 2023-03-21 PROCEDURE — 99999 PR PBB SHADOW E&M-EST. PATIENT-LVL I: CPT | Mod: PBBFAC,,, | Performed by: SOCIAL WORKER

## 2023-03-21 PROCEDURE — 90785 PSYTX COMPLEX INTERACTIVE: CPT | Mod: S$GLB,,, | Performed by: SOCIAL WORKER

## 2023-03-21 PROCEDURE — 90785 PR INTERACTIVE COMPLEXITY: ICD-10-PCS | Mod: S$GLB,,, | Performed by: SOCIAL WORKER

## 2023-03-21 PROCEDURE — 90837 PSYTX W PT 60 MINUTES: CPT | Mod: S$GLB,,, | Performed by: SOCIAL WORKER

## 2023-03-21 PROCEDURE — 99999 PR PBB SHADOW E&M-EST. PATIENT-LVL I: ICD-10-PCS | Mod: PBBFAC,,, | Performed by: SOCIAL WORKER

## 2023-03-21 NOTE — PROGRESS NOTES
Psychotherapy - Initial Intake  [facility]  Department of Psychiatry  [facility]        Patient Name: Paula Rodriguez  Date:  2023    Referral source: Eron Amaya P*      History of present illness:  Paula Rodriguez is a 52 y.o. Not  or /a female referred by Eron Amaya P*.  Patient medical record was reviewed and patient was seen for initial interview.    Content of Current Session: Pt was on time to scheduled initial session. Session focused on building rapport, establishing a therapeutic relationship and history of mental health treatment. Clinician went over limits to confidentiality, including mandated reporting and safety during potential crisis.  Pt is cooperative and engaged throughout session. No indicators of hallucinations, delusions, bizarre behaviors or other indicators of psychotic process. Pt denies SI/HI at this time.    In addition to the information contained herein, please refer to the initial psychiatric intake conducted by  Eron Amaya PA-C, dated 2023.    Paula shares that she is struggling with anxiety mostly, since the death of her  one year ago.  Tearful through early part of session.  Feels depression is not quite so heavy, but cannot stop her thoughts - using alcohol to numb/distract/avoid.  She shares that  Naun was 70 years old when he  (18 years older than pt).  They met when she was traveling with a friend and saw each other annually for a few years.  They  so that she could stay in the [place] and after a year, found themselves in love.   26 years, no children.  Paula shares that  had a severe back injury at age 17, complications from this caused severe debilitation and ultimately loss of control of bladder and bowel.  Ultimately he committed suicide at age 69, wife found his body.      Pt has sister (56), mom (80) and step-father (87) all in  [place].    Paula works for the [employer] and likes both her work and her co-workers.  Only complaint is that she isn't very busy.  Works from home 3 days/week, and the office is very quiet even on the days when she is there.  She enjoys gardening and working in her yard.  She has Kisw[place]ili Cedillo Dog, Azar (90lbs) who give her great shira and company.  She likes to read.  Identifies as athiest.  Has a few close friends - one a near neighbor.    She acknowledges drinking too much - but has "cut back" since first seeing psychiatrist and desires to reduce her intake further.  Introduced 1% change goal.  She smokes cigarettes sporadically, wants to quit (has before and maintained 20 years).  Gambles at neighborhood bar. Began to talk about finding meaning and fulfillment in life after the loss of spouse.  Introduced ACT and the value of mindfulness practices.  Encouraged pt to look into available yoga classes and we can discuss before she commits.    FU as scheduled. Tt sending TedTalk on mindfulness and "Fueled" podcast regarding alcohol effects.       Risk assessment:  Patient reports no suicidal ideation  Patient reports no homicidal ideation  Patient reports no self-injurious behavior  Patient reports no violent behavior    Past Medical History:   Diagnosis Date    Anxiety disorder 9/6/2013    Hypertension     Insomnia 9/6/2013         Current Outpatient Medications:     citalopram (CELEXA) 20 MG tablet, Take 1 tablet (20 mg total) by mouth once daily., Disp: 30 tablet, Rfl: 1    hydroCHLOROthiazide (HYDRODIURIL) 12.5 MG Tab, Take 1 tablet (12.5 mg total) by mouth once daily., Disp: 90 tablet, Rfl: 3    hydrOXYzine pamoate (VISTARIL) 25 MG Cap, Take 1 capsule (25 mg total) by mouth 4 (four) times daily., Disp: 60 capsule, Rfl: 0    traZODone (DESYREL) 50 MG tablet, Take 1 tablet (50 mg total) by mouth every evening., Disp: 30 tablet, Rfl: 0    valsartan (DIOVAN) 160 MG tablet, Take 1 tablet (160 mg total) by " mouth once daily., Disp: 90 tablet, Rfl: 3        Mental Status Exam:  General appearance:  appears stated age, neatly dressed, well groomed  Speech:  Clear and intelligible, normal rate, normal tone, normal pitch, normal volume  Level of cooperation:  cooperative  Thought processes:  Linear, logical, goal-directed  Mood:  Generally euthymic some nervousness and restlessness noted at times  Thought content:  Relevant and appropriate, no delusions, no visual hallucinations, no auditory hallucinations, no delusions, no active or passive homicidal thoughts, no active or passive suicidal ideation, no obsessions, no compulsions, no violence  Affect:  appropriate and congruent  Orientation:  oriented to person, place, situation and date  Memory/Attention/Concentration:  No gross cognitive deficits noted during conversation.  Judgment and insight: fair  Language:  intact      Strengths and liabilities: Strength: Patient is expressive/articulate., Strength: Patient is intelligent., Strength: Patient is motivated for change., Liability: Patient lacks coping skills.    Diagnosis:  1. Adjustment disorder with mixed anxiety and depressed mood        2. Grief               TREATMENT GOALS: Depression:    Increased understanding of depressive feelings;    Address issues underlying depressive feelings;  Participate actively in individual therapy;  Correct irrational thinking which leads to depression;  Address issues of dependence, helplessness, and hopelessness;  Decrease extreme symptoms of depression through improved coping;  TREATMENT GOALS: Anxiety: increased understanding of anxiety; identify and restructure unhelpful thoughts; identify triggers of anxiety and reduce vulnerability to uncontrolled anxiety;      PLAN: In this session a psychiatric evaluation was conducted to get history and process pt's life. Therapist reviewed limits of confidentiality, missed appt/late show rules, need to arrive on time and expectation they  will be an active participant in their care by asking questions, notifying staff of any medical problems. Mindfulness Techniques and ACT will be utilized in future individual therapy sessions to increase insight, support, and behavior modification.     Return to Clinic: as scheduled    Length of Service (minutes): 60

## 2023-03-24 ENCOUNTER — TELEPHONE (OUTPATIENT)
Dept: FAMILY MEDICINE | Facility: CLINIC | Age: 53
End: 2023-03-24
Payer: COMMERCIAL

## 2023-03-24 ENCOUNTER — PATIENT MESSAGE (OUTPATIENT)
Dept: FAMILY MEDICINE | Facility: CLINIC | Age: 53
End: 2023-03-24
Payer: COMMERCIAL

## 2023-03-24 RX ORDER — MECLIZINE HYDROCHLORIDE 25 MG/1
25 TABLET ORAL 3 TIMES DAILY PRN
Qty: 60 TABLET | Refills: 12 | Status: SHIPPED | OUTPATIENT
Start: 2023-03-24

## 2023-03-24 NOTE — TELEPHONE ENCOUNTER
"Call was placed to patient who stated they were experiencing dizziness and light-headedness. Began when recently prescribed hydroxyzine, celexa, and trazodone for anxiety. Prescribed by psych. Patient denies tinnitus. Asked patient if they contacted psych and they stated, "yes", the doctor advised them to stop the medications. Patient stopped them a week ago but is still experiencing the symptoms and would like the advice of their PCP related to ENT referral. Assured patient that PCP would receive the message and allow time for a response. Verbalized understanding and thank you.   "

## 2023-03-24 NOTE — TELEPHONE ENCOUNTER
----- Message from Ara Muniz sent at 3/24/2023  8:23 AM CDT -----  Type: Patient Call Back    Who called:Self     What is the request in detail: Asking for a referral to see ENT     Can the clinic reply by [facility]? Yes     Would the patient rather a call back or a response via [facility]? Either     Best call back number: [phone]

## 2023-03-24 NOTE — TELEPHONE ENCOUNTER
Please give patient a call back and let her know I sent her a [facility] message and just confirm that she has access and can get that message so that she can get me back some details

## 2023-03-24 NOTE — TELEPHONE ENCOUNTER
----- Message from Ara Muniz sent at 3/24/2023  8:23 AM CDT -----  Type: Patient Call Back    Who called:Self     What is the request in detail: Asking for a referral to see ENT     Can the clinic reply by [facility]? Yes     Would the patient rather a call back or a response via [facility]? Either     Best call back number: [phone]

## 2023-04-03 ENCOUNTER — OFFICE VISIT (OUTPATIENT)
Dept: PSYCHIATRY | Facility: CLINIC | Age: 53
End: 2023-04-03
Payer: COMMERCIAL

## 2023-04-03 VITALS
BODY MASS INDEX: 24.78 KG/M2 | HEART RATE: 123 BPM | HEIGHT: 69 IN | DIASTOLIC BLOOD PRESSURE: 102 MMHG | OXYGEN SATURATION: 98 % | WEIGHT: 167.31 LBS | SYSTOLIC BLOOD PRESSURE: 174 MMHG

## 2023-04-03 DIAGNOSIS — F43.23 ADJUSTMENT DISORDER WITH MIXED ANXIETY AND DEPRESSED MOOD: Primary | ICD-10-CM

## 2023-04-03 DIAGNOSIS — F43.21 GRIEF: ICD-10-CM

## 2023-04-03 PROCEDURE — 3008F BODY MASS INDEX DOCD: CPT | Mod: CPTII,S$GLB,,

## 2023-04-03 PROCEDURE — 3077F SYST BP >= 140 MM HG: CPT | Mod: CPTII,S$GLB,,

## 2023-04-03 PROCEDURE — 99999 PR PBB SHADOW E&M-EST. PATIENT-LVL III: ICD-10-PCS | Mod: PBBFAC,,,

## 2023-04-03 PROCEDURE — 4010F PR ACE/ARB THEARPY RXD/TAKEN: ICD-10-PCS | Mod: CPTII,S$GLB,,

## 2023-04-03 PROCEDURE — 4010F ACE/ARB THERAPY RXD/TAKEN: CPT | Mod: CPTII,S$GLB,,

## 2023-04-03 PROCEDURE — 3080F PR MOST RECENT DIASTOLIC BLOOD PRESSURE >= 90 MM HG: ICD-10-PCS | Mod: CPTII,S$GLB,,

## 2023-04-03 PROCEDURE — 99214 PR OFFICE/OUTPT VISIT, EST, LEVL IV, 30-39 MIN: ICD-10-PCS | Mod: S$GLB,,,

## 2023-04-03 PROCEDURE — 3008F PR BODY MASS INDEX (BMI) DOCUMENTED: ICD-10-PCS | Mod: CPTII,S$GLB,,

## 2023-04-03 PROCEDURE — 99999 PR PBB SHADOW E&M-EST. PATIENT-LVL III: CPT | Mod: PBBFAC,,,

## 2023-04-03 PROCEDURE — 1160F RVW MEDS BY RX/DR IN RCRD: CPT | Mod: CPTII,S$GLB,,

## 2023-04-03 PROCEDURE — 99214 OFFICE O/P EST MOD 30 MIN: CPT | Mod: S$GLB,,,

## 2023-04-03 PROCEDURE — 1160F PR REVIEW ALL MEDS BY PRESCRIBER/CLIN PHARMACIST DOCUMENTED: ICD-10-PCS | Mod: CPTII,S$GLB,,

## 2023-04-03 PROCEDURE — 3077F PR MOST RECENT SYSTOLIC BLOOD PRESSURE >= 140 MM HG: ICD-10-PCS | Mod: CPTII,S$GLB,,

## 2023-04-03 PROCEDURE — 1159F PR MEDICATION LIST DOCUMENTED IN MEDICAL RECORD: ICD-10-PCS | Mod: CPTII,S$GLB,,

## 2023-04-03 PROCEDURE — 1159F MED LIST DOCD IN RCRD: CPT | Mod: CPTII,S$GLB,,

## 2023-04-03 PROCEDURE — 3080F DIAST BP >= 90 MM HG: CPT | Mod: CPTII,S$GLB,,

## 2023-04-03 RX ORDER — CITALOPRAM 20 MG/1
30 TABLET, FILM COATED ORAL DAILY
Qty: 45 TABLET | Refills: 0 | Status: SHIPPED | OUTPATIENT
Start: 2023-04-03 | End: 2023-05-03 | Stop reason: SDUPTHER

## 2023-04-03 RX ORDER — PROPRANOLOL HYDROCHLORIDE 10 MG/1
10 TABLET ORAL 3 TIMES DAILY
Qty: 90 TABLET | Refills: 0 | Status: SHIPPED | OUTPATIENT
Start: 2023-04-03 | End: 2023-05-03 | Stop reason: SDUPTHER

## 2023-04-03 NOTE — PROGRESS NOTES
"Outpatient Psychiatry Follow-Up Visit   4/3/2023    Clinical Status of Patient:  Outpatient (Ambulatory)    Chief Complaint:  Paula Rodriguez is a 52 y.o. female who presents today for follow-up of depression and anxiety.  Met with patient.      Interval History and Content of Current Session 04/03/2023:  Pt is A+Ox 4.  Patients mood is "ok", affect appears blunted, sad. Pts thought process is normal and logical.  Pts speech is normal tone, normal rate, normal pitch, normal volume   Linear and logical, friendly and cooperative, poor eye contact, no psychomotor retardation.  Pt is tearful during interview.     Patient states that she has been doing OK since her last appointment. Patient states that her anniversary was two weeks ago ,states that since then her mood has been low. Patient continued to endorse heavy drinking, 5 drinks per night. Patient states that she understands her drinking is becoming an issue, currently is not interested in medications that can cut cravings. Patient encouraged to decrease alcohol consumption as this affects mood. Patient states that she is planning on releasing her late 's ashes Mid-May 2023 in [place].     Reports depression today as 4/10, and anxiety as 5/10.  Pt reports taking medications as prescribed and behaving appropriately during interview today.  Denies SI/HI/AVH. Denies side effects of medications.  Pt reports sleeping well and normal appetite.     Denies recreational drug use. Pt reports 35 drinks per week, 0.5ppd tobacco use, denies Vaping, 2 cups of coffee Caffeine.  CAGE 2/4.          Interim events: 03/02/2023  Pt is A+Ox 4.  Patients mood is "better", affect appears appropriate, sad. Pts thought process is normal and logical.  Pts speech is normal tone, normal rate, normal pitch, normal volume   Linear and logical, friendly and cooperative, good eye contact, no psychomotor retardation.  Pt is calmly seated in chair during interview. Pt is casually dressed " "and well groomed.  Pt is not tearful during interview    Patient states that her anxiety and depression improved since starting Celexa, patient is currently taking Celexa 10mg daily. Pt states that she has cut back on her drinking, currently reports drinking 14 beers a week. Patient states that she has stopped drinking hard liquor. During the last two weeks Pt reports hanging out with friends which has help distract her. Patient also states that she has begun gardening again.    Reports depression today as 4/10, and anxiety as 2/10.    Pt reports taking medications as prescribed and behaving appropriately during interview today.  Denies SI/HI/AVH. Denies side effects of medications.  Pt reports sleeping well and normal appetite.     Denies recreational drug use. Pt reports 14 drinks per week, 0.5ppd tobacco use, denies Vaping, 2 cups of coffee Caffeine.  CAGE 2/4.        Prior visit :  Psych Interview 2023:   Paula Rodriguez is a 52 y.o. female with past psychiatric history of depression  presented to for initial evaluation and treatment for depression and anxiety.     Pt is A+Ox 4.  Patients mood is "not good", affect appears guarded, sad, anxious. Pts thought process is normal and logical.  Pts speech is normal tone, normal rate, normal pitch, normal volume   Linear and logical, cooperative, poor eye contact, psychomotor retardation.  Pt is tearful during interview.  Pt is casually dressed and well groomed.       States that she has been taking lexapro for 15 years for depression.  States that she was well controlled until her  unexpectedly  in 2022.  States that she has not been able to grieve his passing.  States that she is from [place] and all of her family lives there.  States that she does have local friends who she can talk to.  States that she has looked into group therapy, but was unsuccessful.  States that she is interested in talk therapy at this time.  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  States that she enjoys her work but works 3 days a week from home.  States that being home is not good for her mental health and she has been going on walks with her dog to get out the house.       Denies prior hx of psychiatric hospitalizations. Denies hx of suicide attempts. Pt denies hx self harm. Pt denies hx hallucinations.  Pt denies hx of eating disorders.   Pt denies hx trauma. Denies physical/sexual abuse. Pt denies symptoms including nightmares, hypervigilance, flashbacks, avoidance behaviors, and disassociation.     Reports depression today as 6/10, and anxiety as 4/10.  Reports sleeping 8 hrs per night, and poor appetite. States that she consumes 5 drinks every night to go to sleep.  State sthat she wants to stop drinks.  States that she has used drinking to cope with her husbands passing.  Lost 20lbs over this last year.    Denies SI/HI/AVH. Denies side effects of medications.     Pt states that there support consists of friends and coworkers.       Denies recreational drug use. Pt reports 35 drinks per week, 0.5ppd tobacco use, denies Vaping, 2 cups of coffee Caffeine.  CAGE 2/4.       Current Medication:  Lexapro 20mg daily  Ambien 10mg nightly        DX:  The patient complained of depressed mood with lethargy, decreased appetite , insomnia, psycho-motor retardation, anhedonia, apathy, worsening self-esteem, guilt, decreased concentration and ability to make decisions, and thoughts of suicide.      Pt denies hx symptoms/episodes of cathleen.     Admits to symptoms of anxiety including excessive anxiety/worry/fear, more days than not, about numerous issues, difficulty controlling the worry, over thinking, rumination, restlessness, poor concentration, fatigue, and increased irritability. Denies panic attacks at this time.     Past Psychiatric History:   Previous Psychiatric Hospitalizations: NO  Previous Medication Trials: YES:      History of psychotherapy:   NO  Previous Suicide Attempts: NO  History of Violence:  NO  History of physical/sexual abuse: NO  Outpatient psychiatric provider(past): NO     Substance Abuse History:   Tobacco: NO  Alcohol: YES:      Illicit Substances: NO  Detox/Rehab: NO     Neurological History:   Seizures: NO  Head trauma: NO     Family Psychiatric History: Yes -   Father - depression     Social History:  Developmental/Childhood:Achieved all developmental milestones timely  *Education:Bachelor's Degree  Employment Status/Finances:Employed   Relationship Status/Sexual Orientation:    Children: 0  Housing Status: Home    history:  NO  Access to gun: YES:      Adventism: agnostic  Recreational activities: gardening   Person patient is closest to/confides in: friends     Legal History:   Past Charges/Incarcerations:  No      Review of Systems     Review of Systems   Constitutional:  Negative for weight loss.   HENT:  Negative for tinnitus.    Eyes:  Negative for blurred vision.   Respiratory:  Negative for cough and shortness of breath.    Cardiovascular:  Negative for chest pain.   Gastrointestinal:  Negative for abdominal pain.   Genitourinary:  Negative for dysuria.   Musculoskeletal:  Negative for back pain and neck pain.   Skin:  Negative for rash.   Neurological:  Negative for dizziness, seizures and weakness.   Psychiatric/Behavioral:  Positive for depression. Negative for hallucinations, memory loss, substance abuse and suicidal ideas. The patient is nervous/anxious. The patient does not have insomnia.      Psychiatric Review Of Systems - Is patient experiencing or having changes in:  sleep: no  appetite: no  weight: no  energy/anergy: yes  interest/pleasure/anhedonia: yes  somatic symptoms: no  libido: no  anxiety/panic: yes  guilty/hopelessness: no  concentration: no  S.I.B.s/risky behavior: no  Irritability: no  Racing thoughts: no  Impulsive behaviors: no  Paranoia: no  AVH: no      Past Medical, Family and Social  History: The patient's past medical, family and social history have been reviewed and updated as appropriate within the electronic medical record - see encounter notes.      Current Medications:   Medication List with Changes/Refills   New Medications    CITALOPRAM (CELEXA) 20 MG TABLET    Take 1.5 tablets (30 mg total) by mouth once daily.    PROPRANOLOL (INDERAL) 10 MG TABLET    Take 1 tablet (10 mg total) by mouth 3 (three) times daily.   Current Medications    HYDROCHLOROTHIAZIDE (HYDRODIURIL) 12.5 MG TAB    Take 1 tablet (12.5 mg total) by mouth once daily.    HYDROXYZINE PAMOATE (VISTARIL) 25 MG CAP    Take 1 capsule (25 mg total) by mouth 4 (four) times daily.    MECLIZINE (ANTIVERT) 25 MG TABLET    Take 1 tablet (25 mg total) by mouth 3 (three) times daily as needed for Dizziness (or at least one HS for 1 week when vertigo active).    TRAZODONE (DESYREL) 50 MG TABLET    Take 1 tablet (50 mg total) by mouth every evening.    VALSARTAN (DIOVAN) 160 MG TABLET    Take 1 tablet (160 mg total) by mouth once daily.   Discontinued Medications    CITALOPRAM (CELEXA) 20 MG TABLET    Take 1 tablet (20 mg total) by mouth once daily.         Allergies:   Review of patient's allergies indicates:  No Known Allergies      Vitals   Vitals:    04/03/23 1011   BP: (!) 174/102   Pulse:             Labs/Imaging/Studies:   No results found for this or any previous visit (from the past 48 hour(s)).   No results found for: PHENYTOIN, PHENOBARB, VALPROATE, CBMZ    Compliance: yes    Side effects: None    Risk Parameters:  Patient reports no suicidal ideation  Patient reports no homicidal ideation  Patient reports no self-injurious behavior  Patient reports no violent behavior    Exam (detailed: at least 9 elements; comprehensive: all 15 elements)   Constitutional  Vitals:  Most recent vital signs, dated less than 90 days prior to this appointment, were reviewed.   Vitals:    04/03/23 0920 04/03/23 1011   BP: (!) 178/118 (!) 174/102  "  Pulse: (!) 123    SpO2: 98%    Weight: 75.9 kg (167 lb 5.3 oz)    Height: 5' 9" (1.753 m)           General:  unremarkable, age appropriate, casually dressed, neatly groomed     Musculoskeletal  Muscle Strength/Tone:  no spasicity, no rigidity, no cogwheeling, no flaccidity, no paratonia, no dyskinesia, no dystonia, no tremor, no tic, no choreoathetosis, no atrophy   Gait & Station:  non-ataxic     Psychiatric  Speech:  no latency; no press   Mood & Affect:  steady, anxious  appropriate, guarded, anxious   Thought Process:  normal and logical   Associations:  intact   Thought Content:  normal, no suicidality, no homicidality, delusions, or paranoia   Insight:  intact, has awareness of illness   Judgement: behavior is adequate to circumstances, age appropriate   Orientation:  grossly intact, person, place, situation, time/date   Memory: intact for content of interview, grossly intact, memory >3 objects at five mins   Language: grossly intact, able to name, able to repeat   Attention Span & Concentration:  able to focus, completed tasks   Fund of Knowledge:  intact and appropriate to age and level of education, familiar with aspects of current personal life     Assessment and Diagnosis   Status/Progress: Based on the examination today, the patient's problem(s) is/are resolving.  New problems have not been presented today.       General Impression:      ICD-10-CM ICD-9-CM   1. Adjustment disorder with mixed anxiety and depressed mood  F43.23 309.28   2. Grief  F43.21 309.0         Intervention/Counseling/Treatment Plan   Mood  Increase Celexa 30mg daily - anxiety and depression   Continue Vistaril 25mg Q6 PRN anxiety   Start Propanolol 10mg TID PRN anxiety   Pt extensively warned of the risks of developing hypotension while taking this medications.  Symptoms include lightheadedness, dizziness, feeling of passing out.  Pt instructed to stop taking this med if she experiences any of the symptoms.  Pt informed to not " take vistaril and propanolol together    Sleep  Continue Trazodone 50mg nightly- sleep    At this time Pt is not a candidate for Benzos due to heavy drinking.     Referral for talk therapy placed.  Pt was extensively educated in the importance of making and keeping a talk therapy appointment. Appointment with Latisha 5/03/2023    Follow up with Dr. Matos concerning high BP   In clinic Pt denies HA, SOB, Leg swelling, leg pain, CP, changes in vision   Pt states that she took her BP before coming to clinic and it was 130/90.  Pt states that she believes her high BP is due to anxiety related to coming to the clinic.      Labs ordered    Discussed diagnosis, risks and benefits of proposed treatment above vs alternative treatments vs no treatment, and potential side effects of these treatments, and the inherent unpredictability of individual response to treatment.  The patient expresses understanding and gives informed consent to pursue treatment.  The potential benefits outweigh the potential risks. Patient has no other questions. Risks/adverse effects discussed at this time including but not limited to: GI side effects, sexual dysfunction, activation vs sedation, triggering of suicidal thoughts, and serotonin syndrome.    Serotonin syndrome   Mental status changes can include anxiety, restlessness, disorientation, and agitated delirium.    Autonomic manifestations can include diaphoresis, tachycardia, hyperthermia, hypertension, vomiting, and diarrhea   Neuromuscular hyperactivity can manifest as tremor, myoclonus, hyperreflexia, rigidity, hyperthermia, seizure, and bilateral Babinski sign.   Pt was informed that if they experience any of these symptoms to go the ED.     Difficulty Sleeping Behavioral Modification:  Implement stimulus control: [place] bedroom for sleep only. Leave bedroom when frustrated from not sleeping. Engage in relaxation before returning. Engage in activities during the day. AVOID >7-8 h time  in bed  Avoid clock watching  Avoid thinking/worrying about sleep when trying to fall asleep  Limit caffeinated consumption  Make sure the bedroom is dark, quiet and cool    Safety Plan   Patient voices understanding and agreement with this plan  Provided crisis numbers  Encouraged patient to keep future appointments.  Instructed patient to call or message with questions or concerns  In the event of an emergency, including suicidal ideation, patient was advised to go to the emergency room and/or call 911    Return to Clinic: 1 month - before going on vacation     Psychotherapy:  Target symptoms: depression, anxiety   Why chosen therapy is appropriate versus another modality: relevant to diagnosis, evidence based practice  Outcome monitoring methods: self-report, observation  Therapeutic intervention type: insight oriented psychotherapy, supportive psychotherapy  Topics discussed/themes: illness/death of a loved one, difficulty managing affect in interpersonal relationships, building skills sets for symptom management, symptom recognition  The patient's response to the intervention is accepting. The patient's progress toward treatment goals is good.   Duration of intervention: 15 minutes.    Total face to face time: 30 min  Total time (chart review, patient contact, documentation): 38 min    A diagnostic psychiatric evaluation was performed and responsiveness to treatment was assessed.  The patient demonstrates robust ability/capacity to respond to treatment.    Eron Amaya PA-C    *This note has been prepared using a combination of a dictation device and typing.  It has been checked for errors but some errors may still exist within the note as a result of speech recognition errors and/or typographical errors.

## 2023-04-13 ENCOUNTER — LAB VISIT (OUTPATIENT)
Dept: LAB | Facility: HOSPITAL | Age: 53
End: 2023-04-13
Attending: INTERNAL MEDICINE
Payer: COMMERCIAL

## 2023-04-13 DIAGNOSIS — F43.23 ADJUSTMENT DISORDER WITH MIXED ANXIETY AND DEPRESSED MOOD: ICD-10-CM

## 2023-04-13 DIAGNOSIS — F43.21 GRIEF: ICD-10-CM

## 2023-04-13 LAB
BASOPHILS # BLD AUTO: 0.04 K/UL (ref 0–0.2)
BASOPHILS NFR BLD: 0.7 % (ref 0–1.9)
CHOLEST SERPL-MCNC: 224 MG/DL (ref 120–199)
CHOLEST/HDLC SERPL: 2.6 {RATIO} (ref 2–5)
DIFFERENTIAL METHOD: ABNORMAL
EOSINOPHIL # BLD AUTO: 0.1 K/UL (ref 0–0.5)
EOSINOPHIL NFR BLD: 2.5 % (ref 0–8)
ERYTHROCYTE [DISTWIDTH] IN BLOOD BY AUTOMATED COUNT: 11.3 % (ref 11.5–14.5)
HCT VFR BLD AUTO: 40.9 % (ref 37–48.5)
HDLC SERPL-MCNC: 86 MG/DL (ref 40–75)
HDLC SERPL: 38.4 % (ref 20–50)
HGB BLD-MCNC: 13.7 G/DL (ref 12–16)
IMM GRANULOCYTES # BLD AUTO: 0.02 K/UL (ref 0–0.04)
IMM GRANULOCYTES NFR BLD AUTO: 0.4 % (ref 0–0.5)
LDLC SERPL CALC-MCNC: 123.8 MG/DL (ref 63–159)
LYMPHOCYTES # BLD AUTO: 1.2 K/UL (ref 1–4.8)
LYMPHOCYTES NFR BLD: 21.4 % (ref 18–48)
MCH RBC QN AUTO: 32.2 PG (ref 27–31)
MCHC RBC AUTO-ENTMCNC: 33.5 G/DL (ref 32–36)
MCV RBC AUTO: 96 FL (ref 82–98)
MONOCYTES # BLD AUTO: 0.5 K/UL (ref 0.3–1)
MONOCYTES NFR BLD: 9.2 % (ref 4–15)
NEUTROPHILS # BLD AUTO: 3.7 K/UL (ref 1.8–7.7)
NEUTROPHILS NFR BLD: 65.8 % (ref 38–73)
NONHDLC SERPL-MCNC: 138 MG/DL
NRBC BLD-RTO: 0 /100 WBC
PLATELET # BLD AUTO: 287 K/UL (ref 150–450)
PMV BLD AUTO: 10.2 FL (ref 9.2–12.9)
RBC # BLD AUTO: 4.26 M/UL (ref 4–5.4)
TRIGL SERPL-MCNC: 71 MG/DL (ref 30–150)
TSH SERPL DL<=0.005 MIU/L-ACNC: 1.63 UIU/ML (ref 0.4–4)
WBC # BLD AUTO: 5.56 K/UL (ref 3.9–12.7)

## 2023-04-13 PROCEDURE — 80061 LIPID PANEL: CPT

## 2023-04-13 PROCEDURE — 84443 ASSAY THYROID STIM HORMONE: CPT

## 2023-04-13 PROCEDURE — 36415 COLL VENOUS BLD VENIPUNCTURE: CPT | Mod: PO

## 2023-04-13 PROCEDURE — 85025 COMPLETE CBC W/AUTO DIFF WBC: CPT

## 2023-04-13 PROCEDURE — 83036 HEMOGLOBIN GLYCOSYLATED A1C: CPT

## 2023-04-14 LAB
ESTIMATED AVG GLUCOSE: 103 MG/DL (ref 68–131)
HBA1C MFR BLD: 5.2 % (ref 4–5.6)

## 2023-05-01 ENCOUNTER — CLINICAL SUPPORT (OUTPATIENT)
Dept: FAMILY MEDICINE | Facility: CLINIC | Age: 53
End: 2023-05-01
Payer: COMMERCIAL

## 2023-05-01 VITALS — HEART RATE: 73 BPM | OXYGEN SATURATION: 99 % | SYSTOLIC BLOOD PRESSURE: 136 MMHG | DIASTOLIC BLOOD PRESSURE: 86 MMHG

## 2023-05-01 DIAGNOSIS — I10 ESSENTIAL HYPERTENSION: Primary | ICD-10-CM

## 2023-05-01 PROCEDURE — 99999 PR PBB SHADOW E&M-EST. PATIENT-LVL II: ICD-10-PCS | Mod: PBBFAC,,,

## 2023-05-01 PROCEDURE — 99999 PR PBB SHADOW E&M-EST. PATIENT-LVL II: CPT | Mod: PBBFAC,,,

## 2023-05-01 NOTE — PROGRESS NOTES
Paula Rodriguez 52 y.o. female is here today for Blood Pressure check.   History of HTN yes.    Review of patient's allergies indicates:  No Known Allergies  Creatinine   Date Value Ref Range Status   08/03/2021 0.9 0.5 - 1.4 mg/dL Final     Sodium   Date Value Ref Range Status   08/03/2021 140 136 - 145 mmol/L Final     Potassium   Date Value Ref Range Status   08/03/2021 4.5 3.5 - 5.1 mmol/L Final   ]  Patient verifies taking blood pressure medications on a regular basis at the same time of the day.     Current Outpatient Medications:     citalopram (CELEXA) 20 MG tablet, Take 1.5 tablets (30 mg total) by mouth once daily., Disp: 45 tablet, Rfl: 0    hydroCHLOROthiazide (HYDRODIURIL) 12.5 MG Tab, Take 1 tablet (12.5 mg total) by mouth once daily., Disp: 90 tablet, Rfl: 3    hydrOXYzine pamoate (VISTARIL) 25 MG Cap, Take 1 capsule (25 mg total) by mouth 4 (four) times daily., Disp: 60 capsule, Rfl: 0    meclizine (ANTIVERT) 25 mg tablet, Take 1 tablet (25 mg total) by mouth 3 (three) times daily as needed for Dizziness (or at least one HS for 1 week when vertigo active)., Disp: 60 tablet, Rfl: 12    propranoloL (INDERAL) 10 MG tablet, Take 1 tablet (10 mg total) by mouth 3 (three) times daily., Disp: 90 tablet, Rfl: 0    traZODone (DESYREL) 50 MG tablet, Take 1 tablet (50 mg total) by mouth every evening., Disp: 30 tablet, Rfl: 0    valsartan (DIOVAN) 160 MG tablet, Take 1 tablet (160 mg total) by mouth once daily., Disp: 90 tablet, Rfl: 3  Does patient have record of home blood pressure readings no.   Last dose of blood pressure medication was taken at 7:30am.  Patient is asymptomatic.   Complains of none.    Vitals:    05/01/23 0930   BP: 136/86   BP Location: Left arm   Patient Position: Sitting   BP Method: Medium (Manual)   Pulse: 73   SpO2: 99%         Dr. Matos informed of nurse visit.

## 2023-05-02 ENCOUNTER — TELEPHONE (OUTPATIENT)
Dept: PSYCHIATRY | Facility: CLINIC | Age: 53
End: 2023-05-02
Payer: COMMERCIAL

## 2023-05-02 NOTE — TELEPHONE ENCOUNTER
Called and spoke to pt. Made aware Latisha is out w/ leela and is converting her clinic to virtual tomorrow. Asked if pt was ok with virtual, pt accepted.    Confirmed in person appt w/ Juni at 11am.

## 2023-05-03 ENCOUNTER — OFFICE VISIT (OUTPATIENT)
Dept: PSYCHIATRY | Facility: CLINIC | Age: 53
End: 2023-05-03
Payer: COMMERCIAL

## 2023-05-03 VITALS
WEIGHT: 167 LBS | HEIGHT: 69 IN | BODY MASS INDEX: 24.73 KG/M2 | DIASTOLIC BLOOD PRESSURE: 95 MMHG | OXYGEN SATURATION: 98 % | HEART RATE: 73 BPM | SYSTOLIC BLOOD PRESSURE: 156 MMHG

## 2023-05-03 DIAGNOSIS — F43.23 ADJUSTMENT DISORDER WITH MIXED ANXIETY AND DEPRESSED MOOD: ICD-10-CM

## 2023-05-03 DIAGNOSIS — F43.21 GRIEF: Primary | ICD-10-CM

## 2023-05-03 DIAGNOSIS — F43.23 ADJUSTMENT DISORDER WITH MIXED ANXIETY AND DEPRESSED MOOD: Primary | ICD-10-CM

## 2023-05-03 PROCEDURE — 99999 PR PBB SHADOW E&M-EST. PATIENT-LVL III: ICD-10-PCS | Mod: PBBFAC,,,

## 2023-05-03 PROCEDURE — 99214 PR OFFICE/OUTPT VISIT, EST, LEVL IV, 30-39 MIN: ICD-10-PCS | Mod: S$GLB,,,

## 2023-05-03 PROCEDURE — 3080F DIAST BP >= 90 MM HG: CPT | Mod: CPTII,S$GLB,,

## 2023-05-03 PROCEDURE — 3008F BODY MASS INDEX DOCD: CPT | Mod: CPTII,S$GLB,,

## 2023-05-03 PROCEDURE — 3080F PR MOST RECENT DIASTOLIC BLOOD PRESSURE >= 90 MM HG: ICD-10-PCS | Mod: CPTII,S$GLB,,

## 2023-05-03 PROCEDURE — 4010F PR ACE/ARB THEARPY RXD/TAKEN: ICD-10-PCS | Mod: CPTII,95,, | Performed by: SOCIAL WORKER

## 2023-05-03 PROCEDURE — 3077F PR MOST RECENT SYSTOLIC BLOOD PRESSURE >= 140 MM HG: ICD-10-PCS | Mod: CPTII,S$GLB,,

## 2023-05-03 PROCEDURE — 99214 OFFICE O/P EST MOD 30 MIN: CPT | Mod: S$GLB,,,

## 2023-05-03 PROCEDURE — 1159F PR MEDICATION LIST DOCUMENTED IN MEDICAL RECORD: ICD-10-PCS | Mod: CPTII,S$GLB,,

## 2023-05-03 PROCEDURE — 3044F HG A1C LEVEL LT 7.0%: CPT | Mod: CPTII,S$GLB,,

## 2023-05-03 PROCEDURE — 3008F PR BODY MASS INDEX (BMI) DOCUMENTED: ICD-10-PCS | Mod: CPTII,S$GLB,,

## 2023-05-03 PROCEDURE — 90834 PSYTX W PT 45 MINUTES: CPT | Mod: 95,,, | Performed by: SOCIAL WORKER

## 2023-05-03 PROCEDURE — 3044F PR MOST RECENT HEMOGLOBIN A1C LEVEL <7.0%: ICD-10-PCS | Mod: CPTII,S$GLB,,

## 2023-05-03 PROCEDURE — 1160F RVW MEDS BY RX/DR IN RCRD: CPT | Mod: CPTII,S$GLB,,

## 2023-05-03 PROCEDURE — 1159F MED LIST DOCD IN RCRD: CPT | Mod: CPTII,S$GLB,,

## 2023-05-03 PROCEDURE — 1160F PR REVIEW ALL MEDS BY PRESCRIBER/CLIN PHARMACIST DOCUMENTED: ICD-10-PCS | Mod: CPTII,S$GLB,,

## 2023-05-03 PROCEDURE — 4010F ACE/ARB THERAPY RXD/TAKEN: CPT | Mod: CPTII,95,, | Performed by: SOCIAL WORKER

## 2023-05-03 PROCEDURE — 4010F PR ACE/ARB THEARPY RXD/TAKEN: ICD-10-PCS | Mod: CPTII,S$GLB,,

## 2023-05-03 PROCEDURE — 3044F PR MOST RECENT HEMOGLOBIN A1C LEVEL <7.0%: ICD-10-PCS | Mod: CPTII,95,, | Performed by: SOCIAL WORKER

## 2023-05-03 PROCEDURE — 90834 PR PSYCHOTHERAPY W/PATIENT, 45 MIN: ICD-10-PCS | Mod: 95,,, | Performed by: SOCIAL WORKER

## 2023-05-03 PROCEDURE — 3077F SYST BP >= 140 MM HG: CPT | Mod: CPTII,S$GLB,,

## 2023-05-03 PROCEDURE — 3044F HG A1C LEVEL LT 7.0%: CPT | Mod: CPTII,95,, | Performed by: SOCIAL WORKER

## 2023-05-03 PROCEDURE — 99999 PR PBB SHADOW E&M-EST. PATIENT-LVL III: CPT | Mod: PBBFAC,,,

## 2023-05-03 PROCEDURE — 4010F ACE/ARB THERAPY RXD/TAKEN: CPT | Mod: CPTII,S$GLB,,

## 2023-05-03 RX ORDER — PROPRANOLOL HYDROCHLORIDE 10 MG/1
10 TABLET ORAL 3 TIMES DAILY
Qty: 270 TABLET | Refills: 0 | Status: SHIPPED | OUTPATIENT
Start: 2023-05-03 | End: 2023-06-14 | Stop reason: SDUPTHER

## 2023-05-03 RX ORDER — CITALOPRAM 20 MG/1
30 TABLET, FILM COATED ORAL DAILY
Qty: 135 TABLET | Refills: 0 | Status: SHIPPED | OUTPATIENT
Start: 2023-05-03 | End: 2023-06-14 | Stop reason: SDUPTHER

## 2023-05-03 RX ORDER — TRAZODONE HYDROCHLORIDE 50 MG/1
50 TABLET ORAL NIGHTLY
Qty: 90 TABLET | Refills: 0 | Status: SHIPPED | OUTPATIENT
Start: 2023-05-03 | End: 2023-11-21 | Stop reason: SDUPTHER

## 2023-05-03 NOTE — PROGRESS NOTES
Telemedicine Visit  The patient location is: [place]  The chief complaint leading to consultation is: grief, anxiety and depression    Visit type: audiovisual    Face to Face time with patient: 35   45 minutes of total time spent on the encounter, which includes face to face time and non-face to face time preparing to see the patient (eg, review of tests), Obtaining and/or reviewing separately obtained history, Documenting clinical information in the electronic or other health record, Independently interpreting results (not separately reported) and communicating results to the patient/family/caregiver, or Care coordination (not separately reported).     Each patient to whom he or she provides medical services by telemedicine is:  (1) informed of the relationship between the physician and patient and the respective role of any other health care provider with respect to management of the patient; and (2) notified that he or she may decline to receive medical services by telemedicine and may withdraw from such care at any time.    Notes:       [facility]  Individual Psychotherapy - Follow Up  Paula Rodriguez,  5/3/2023    Site: [facility] - Dept of Psychiatry      Content of current session: Met with Paula for follow up today.  5/3/2023  -  Appt with Juni went well this morning,  Finds she is feeling better, finds she is crawling out of the black hole.  Tried breathing exercises for a little while but got away from the routine.  Went to [place] a couple of weeks ago and spread 's ashes per his wished.  Going to [place] for work next week, looking forward to that.  Processed feelings related to dating in the future, alcohol consumption and cutting down, Meetup for possible social opportunities.  Pt improving with time, not feeling so lonely or disconnected.    FU as scheduled.      From previous encounter:  3/21/2023  -  Paula shares that she is struggling with anxiety mostly, since  "the death of her  one year ago.  Tearful through early part of session.  Feels depression is not quite so heavy, but cannot stop her thoughts - using alcohol to numb/distract/avoid.  She shares that  Naun was 70 years old when he  (18 years older than pt).  They met when she was traveling with a friend and saw each other annually for a few years.  They  so that she could stay in the [place] and after a year, found themselves in love.   26 years, no children.  Paula shares that  had a severe back injury at age 17, complications from this caused severe debilitation and ultimately loss of control of bladder and bowel.  Ultimately he committed suicide at age 69, wife found his body.      Pt has sister (56), mom (80) and step-father (87) all in [place].    Paula works for the [employer] and likes both her work and her co-workers.  Only complaint is that she isn't very busy.  Works from home 3 days/week, and the office is very quiet even on the days when she is there.  She enjoys gardening and working in her yard.  She has [employer] Dog, [employer] (90lbs) who give her great shira and company.  She likes to read.  Identifies as athiest.  Has a few close friends - one a near neighbor.    She acknowledges drinking too much - but has "cut back" since first seeing psychiatrist and desires to reduce her intake further.  Introduced 1% change goal.  She smokes cigarettes sporadically, wants to quit (has before and maintained 20 years).  Gambles at neighborhood bar. Began to talk about finding meaning and fulfillment in life after the loss of spouse.  Introduced ACT and the value of mindfulness practices.  Encouraged pt to look into available yoga classes and we can discuss before she commits.    FU as scheduled. Tt sending TedTalk on mindfulness and "Fueled" podcast regarding alcohol effects.     Treatment plan:  Target symptoms: depression, anxiety , adjustment, grief  Why chosen " therapy is appropriate versus another modality: relevant to diagnosis, evidence based practice  Outcome monitoring methods: self-report, observation  Therapeutic intervention type: insight oriented psychotherapy, behavior modifying psychotherapy, supportive psychotherapy    Risk parameters:  Patient reports no suicidal ideation  Patient reports no homicidal ideation  Patient reports no self-injurious behavior  Patient reports no violent behavior      Patient's response to intervention:  The patient's response to intervention is accepting.    Progress toward goals and other mental status changes:  The patient's progress toward goals is limited.    Diagnosis:     ICD-10-CM ICD-9-CM   1. Grief  F43.21 309.0   2. Adjustment disorder with mixed anxiety and depressed mood  F43.23 309.28       Plan: Pt plans to continue individual psychotherapy    Return to clinic: as scheduled    Length of Service (minutes): 45

## 2023-05-03 NOTE — PROGRESS NOTES
"Outpatient Psychiatry Follow-Up Visit   5/3/2023    Clinical Status of Patient:  Outpatient (Ambulatory)    Chief Complaint:  Paula Rodriguez is a 52 y.o. female who presents today for follow-up of depression and anxiety.  Met with patient.      Interval History and Content of Current Session 05/03/2023:  Pt is A+Ox 4.  Patients mood is "better", affect appears congruent and appropriate. Pts thought process is normal and logical.  Pts speech is normal tone, normal rate, normal pitch, normal volume   Linear and logical, friendly and cooperative, good eye contact, no psychomotor retardation.  Pt is calmly seated in chair during interview.     Patient states that she's been doing better since previous appointment. Patient states that her anxiety and depression are more controlled. Patient is more active previously, spending time in her garden every day. Patient went to [place] and spread her  's ashes ocean with family nearby. Patient states that she was crying happy tears.  patient will be visiting family in [place] in may. Patient states that she is excited to see them.     Reports depression today as 3/10, and anxiety as 3/10.  Pt reports taking medications as prescribed and behaving appropriately during interview today.  Denies SI/HI/AVH. Denies side effects of medications.  Pt reports sleeping well and normal appetite.     Denies recreational drug use. Pt has decreased alcohol consumption.  Pt states that she will continue to work on her drinking.  Pt reports 25 drinks per week.  Pt has stopped smoking, denies Vaping, 2 cups of coffee Caffeine.  CAGE 2/4.        Interim Events: 4/3/2023    Pt is A+Ox 4.  Patients mood is "ok", affect appears blunted, sad. Pts thought process is normal and logical.  Pts speech is normal tone, normal rate, normal pitch, normal volume   Linear and logical, friendly and cooperative, poor eye contact, no psychomotor retardation.  Pt is tearful during interview.     Patient states " "that she has been doing OK since her last appointment. Patient states that her anniversary was two weeks ago ,states that since then her mood has been low. Patient continued to endorse heavy drinking, 5 drinks per night. Patient states that she understands her drinking is becoming an issue, currently is not interested in medications that can cut cravings. Patient encouraged to decrease alcohol consumption as this affects mood. Patient states that she is planning on releasing her late 's ashes Mid-May 2023 in [place].     Reports depression today as 4/10, and anxiety as 5/10.  Pt reports taking medications as prescribed and behaving appropriately during interview today.  Denies SI/HI/AVH. Denies side effects of medications.  Pt reports sleeping well and normal appetite.     Denies recreational drug use. Pt reports 35 drinks per week, 0.5ppd tobacco use, denies Vaping, 2 cups of coffee Caffeine.  CAGE 2/4.          Interim events: 03/02/2023  Pt is A+Ox 4.  Patients mood is "better", affect appears appropriate, sad. Pts thought process is normal and logical.  Pts speech is normal tone, normal rate, normal pitch, normal volume   Linear and logical, friendly and cooperative, good eye contact, no psychomotor retardation.  Pt is calmly seated in chair during interview. Pt is casually dressed and well groomed.  Pt is not tearful during interview    Patient states that her anxiety and depression improved since starting Celexa, patient is currently taking Celexa 10mg daily. Pt states that she has cut back on her drinking, currently reports drinking 14 beers a week. Patient states that she has stopped drinking hard liquor. During the last two weeks Pt reports hanging out with friends which has help distract her. Patient also states that she has begun gardening again.    Reports depression today as 4/10, and anxiety as 2/10.    Pt reports taking medications as prescribed and behaving appropriately during interview " "today.  Denies SI/HI/AVH. Denies side effects of medications.  Pt reports sleeping well and normal appetite.     Denies recreational drug use. Pt reports 14 drinks per week, 0.5ppd tobacco use, denies Vaping, 2 cups of coffee Caffeine.  CAGE 2/4.        Prior visit :  Psych Interview 2023:   Paula Rodriguez is a 52 y.o. female with past psychiatric history of depression  presented to for initial evaluation and treatment for depression and anxiety.     Pt is A+Ox 4.  Patients mood is "not good", affect appears guarded, sad, anxious. Pts thought process is normal and logical.  Pts speech is normal tone, normal rate, normal pitch, normal volume   Linear and logical, cooperative, poor eye contact, psychomotor retardation.  Pt is tearful during interview.  Pt is casually dressed and well groomed.       States that she has been taking lexapro for 15 years for depression.  States that she was well controlled until her  unexpectedly  in 2022.  States that she has not been able to grieve his passing.  States that she is from [facility] and all of her family lives there.  States that she does have local friends who she can talk to.  States that she has looked into group therapy, but was unsuccessful.  States that she is interested in talk therapy at this time.  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  States that she enjoys her work but works 3 days a week from home.  States that being home is not good for her mental health and she has been going on walks with her dog to get out the house.       Denies prior hx of psychiatric hospitalizations. Denies hx of suicide attempts. Pt denies hx self harm. Pt denies hx hallucinations.  Pt denies hx of eating disorders.   Pt denies hx trauma. Denies physical/sexual abuse. Pt denies symptoms including nightmares, hypervigilance, flashbacks, avoidance behaviors, and disassociation.     Reports depression today as 6/10, and anxiety as " 4/10.  Reports sleeping 8 hrs per night, and poor appetite. States that she consumes 5 drinks every night to go to sleep.  State sthat she wants to stop drinks.  States that she has used drinking to cope with her husbands passing.  Lost 20lbs over this last year.    Denies SI/HI/AVH. Denies side effects of medications.     Pt states that there support consists of friends and coworkers.       Denies recreational drug use. Pt reports 35 drinks per week, 0.5ppd tobacco use, denies Vaping, 2 cups of coffee Caffeine.  CAGE 2/4.       Current Medication:  Lexapro 20mg daily  Ambien 10mg nightly        DX:  The patient complained of depressed mood with lethargy, decreased appetite , insomnia, psycho-motor retardation, anhedonia, apathy, worsening self-esteem, guilt, decreased concentration and ability to make decisions, and thoughts of suicide.      Pt denies hx symptoms/episodes of cathleen.     Admits to symptoms of anxiety including excessive anxiety/worry/fear, more days than not, about numerous issues, difficulty controlling the worry, over thinking, rumination, restlessness, poor concentration, fatigue, and increased irritability. Denies panic attacks at this time.     Past Psychiatric History:   Previous Psychiatric Hospitalizations: NO  Previous Medication Trials: YES:      History of psychotherapy:  NO  Previous Suicide Attempts: NO  History of Violence:  NO  History of physical/sexual abuse: NO  Outpatient psychiatric provider(past): NO     Substance Abuse History:   Tobacco: NO  Alcohol: YES:      Illicit Substances: NO  Detox/Rehab: NO     Neurological History:   Seizures: NO  Head trauma: NO     Family Psychiatric History: Yes -   Father - depression     Social History:  Developmental/Childhood:Achieved all developmental milestones timely  *Education:Bachelor's Degree  Employment Status/Finances:Employed   Relationship Status/Sexual Orientation:    Children: 0  Housing Status: Home    history:   NO  Access to gun: YES:      Moravian: agnostic  Recreational activities: gardening   Person patient is closest to/confides in: friends     Legal History:   Past Charges/Incarcerations:  No      Review of Systems     Review of Systems   Constitutional:  Negative for weight loss.   HENT:  Negative for tinnitus.    Eyes:  Negative for blurred vision.   Respiratory:  Negative for cough and shortness of breath.    Cardiovascular:  Negative for chest pain.   Gastrointestinal:  Negative for abdominal pain.   Genitourinary:  Negative for dysuria.   Musculoskeletal:  Negative for back pain and neck pain.   Skin:  Negative for rash.   Neurological:  Negative for dizziness, seizures and weakness.   Psychiatric/Behavioral:  Negative for depression, hallucinations, memory loss, substance abuse and suicidal ideas. The patient is not nervous/anxious and does not have insomnia.      Psychiatric Review Of Systems - Is patient experiencing or having changes in:  sleep: no  appetite: no  weight: no  energy/anergy: yes  interest/pleasure/anhedonia: yes  somatic symptoms: no  libido: no  anxiety/panic: yes  guilty/hopelessness: no  concentration: no  S.I.B.s/risky behavior: no  Irritability: no  Racing thoughts: no  Impulsive behaviors: no  Paranoia: no  AVH: no      Past Medical, Family and Social History: The patient's past medical, family and social history have been reviewed and updated as appropriate within the electronic medical record - see encounter notes.      Current Medications:   Medication List with Changes/Refills   Current Medications    HYDROCHLOROTHIAZIDE (HYDRODIURIL) 12.5 MG TAB    Take 1 tablet (12.5 mg total) by mouth once daily.    HYDROXYZINE PAMOATE (VISTARIL) 25 MG CAP    Take 1 capsule (25 mg total) by mouth 4 (four) times daily.    MECLIZINE (ANTIVERT) 25 MG TABLET    Take 1 tablet (25 mg total) by mouth 3 (three) times daily as needed for Dizziness (or at least one HS for 1 week when vertigo active).     "VALSARTAN (DIOVAN) 160 MG TABLET    Take 1 tablet (160 mg total) by mouth once daily.   Changed and/or Refilled Medications    Modified Medication Previous Medication    CITALOPRAM (CELEXA) 20 MG TABLET citalopram (CELEXA) 20 MG tablet       Take 1.5 tablets (30 mg total) by mouth once daily.    Take 1.5 tablets (30 mg total) by mouth once daily.    PROPRANOLOL (INDERAL) 10 MG TABLET propranoloL (INDERAL) 10 MG tablet       Take 1 tablet (10 mg total) by mouth 3 (three) times daily.    Take 1 tablet (10 mg total) by mouth 3 (three) times daily.    TRAZODONE (DESYREL) 50 MG TABLET traZODone (DESYREL) 50 MG tablet       Take 1 tablet (50 mg total) by mouth every evening.    Take 1 tablet (50 mg total) by mouth every evening.         Allergies:   Review of patient's allergies indicates:  No Known Allergies      Vitals   Vitals:    05/03/23 1056   BP: (!) 156/95   Pulse: 73              Labs/Imaging/Studies:   No results found for this or any previous visit (from the past 48 hour(s)).   No results found for: PHENYTOIN, PHENOBARB, VALPROATE, CBMZ    Compliance: yes    Side effects: None    Risk Parameters:  Patient reports no suicidal ideation  Patient reports no homicidal ideation  Patient reports no self-injurious behavior  Patient reports no violent behavior    Exam (detailed: at least 9 elements; comprehensive: all 15 elements)   Constitutional  Vitals:  Most recent vital signs, dated less than 90 days prior to this appointment, were reviewed.   Vitals:    05/03/23 1056   BP: (!) 156/95   Pulse: 73   SpO2: 98%   Weight: 75.8 kg (167 lb)   Height: 5' 9" (1.753 m)            General:  unremarkable, age appropriate, casually dressed, neatly groomed     Musculoskeletal  Muscle Strength/Tone:  no spasicity, no rigidity, no cogwheeling, no flaccidity, no paratonia, no dyskinesia, no dystonia, no tremor, no tic, no choreoathetosis, no atrophy   Gait & Station:  non-ataxic     Psychiatric  Speech:  no latency; no press "   Mood & Affect:  steady, anxious  appropriate, guarded, anxious   Thought Process:  normal and logical   Associations:  intact   Thought Content:  normal, no suicidality, no homicidality, delusions, or paranoia   Insight:  intact, has awareness of illness   Judgement: behavior is adequate to circumstances, age appropriate   Orientation:  grossly intact, person, place, situation, time/date   Memory: intact for content of interview, grossly intact, memory >3 objects at five mins   Language: grossly intact, able to name, able to repeat   Attention Span & Concentration:  able to focus, completed tasks   Fund of Knowledge:  intact and appropriate to age and level of education, familiar with aspects of current personal life     Assessment and Diagnosis   Status/Progress: Based on the examination today, the patient's problem(s) is/are resolving.  New problems have not been presented today.       General Impression:      ICD-10-CM ICD-9-CM   1. Adjustment disorder with mixed anxiety and depressed mood  F43.23 309.28           Intervention/Counseling/Treatment Plan   Mood  Continue Celexa 30mg daily - anxiety and depression   Stop Vistaril 25mg Q6 PRN anxiety   Continue Propanolol 10mg TID PRN anxiety   Pt extensively warned of the risks of developing hypotension while taking this medications.  Symptoms include lightheadedness, dizziness, feeling of passing out.  Pt instructed to stop taking this med if she experiences any of the symptoms.  Continue Trazodone 50mg nightly- sleep    At this time Pt is not a candidate for Benzos due to heavy drinking.     Referral for talk therapy placed.  Pt was extensively educated in the importance of making and keeping a talk therapy appointment. Appointment with Latisha 5/03/2023.       Follow up with Dr. Matos concerning high BP   In clinic Pt denies HA, SOB, Leg swelling, leg pain, CP, changes in vision   Pt states that she took her BP before coming to clinic and it was 120/85.  Pt  states that she believes her high BP is due to anxiety related to coming to the clinic.      Discussed diagnosis, risks and benefits of proposed treatment above vs alternative treatments vs no treatment, and potential side effects of these treatments, and the inherent unpredictability of individual response to treatment.  The patient expresses understanding and gives informed consent to pursue treatment.  The potential benefits outweigh the potential risks. Patient has no other questions. Risks/adverse effects discussed at this time including but not limited to: GI side effects, sexual dysfunction, activation vs sedation, triggering of suicidal thoughts, and serotonin syndrome.    Serotonin syndrome   Mental status changes can include anxiety, restlessness, disorientation, and agitated delirium.    Autonomic manifestations can include diaphoresis, tachycardia, hyperthermia, hypertension, vomiting, and diarrhea   Neuromuscular hyperactivity can manifest as tremor, myoclonus, hyperreflexia, rigidity, hyperthermia, seizure, and bilateral Babinski sign.   Pt was informed that if they experience any of these symptoms to go the ED.     Difficulty Sleeping Behavioral Modification:  Implement stimulus control: [place] bedroom for sleep only. Leave bedroom when frustrated from not sleeping. Engage in relaxation before returning. Engage in activities during the day. AVOID >7-8 h time in bed  Avoid clock watching  Avoid thinking/worrying about sleep when trying to fall asleep  Limit caffeinated consumption  Make sure the bedroom is dark, quiet and cool    Safety Plan   Patient voices understanding and agreement with this plan  Provided crisis numbers  Encouraged patient to keep future appointments.  Instructed patient to call or message with questions or concerns  In the event of an emergency, including suicidal ideation, patient was advised to go to the emergency room and/or call 911    Return to Clinic: 1 month - before  going on vacation     Psychotherapy:  Target symptoms: depression, anxiety   Why chosen therapy is appropriate versus another modality: relevant to diagnosis, evidence based practice  Outcome monitoring methods: self-report, observation  Therapeutic intervention type: insight oriented psychotherapy, supportive psychotherapy  Topics discussed/themes: illness/death of a loved one, difficulty managing affect in interpersonal relationships, building skills sets for symptom management, symptom recognition  The patient's response to the intervention is accepting. The patient's progress toward treatment goals is good.   Duration of intervention: 15 minutes.    Total face to face time: 30 min  Total time (chart review, patient contact, documentation): 36 min    A diagnostic psychiatric evaluation was performed and responsiveness to treatment was assessed.  The patient demonstrates robust ability/capacity to respond to treatment.    Eron Amaya PA-C    *This note has been prepared using a combination of a dictation device and typing.  It has been checked for errors but some errors may still exist within the note as a result of speech recognition errors and/or typographical errors.

## 2023-05-15 ENCOUNTER — PATIENT MESSAGE (OUTPATIENT)
Dept: FAMILY MEDICINE | Facility: CLINIC | Age: 53
End: 2023-05-15
Payer: COMMERCIAL

## 2023-05-15 NOTE — TELEPHONE ENCOUNTER
Please advise,    Dr. Matos,  I used up all the 160 mg tablets of Valsartan.  My blood pressure has been better with the higher dosage.  Can you send a prescription of the 320 mg to the [facility] pharmacy on [address]?     Thanks.

## 2023-05-18 RX ORDER — VALSARTAN 160 MG/1
160 TABLET ORAL DAILY
Qty: 90 TABLET | Refills: 12 | Status: SHIPPED | OUTPATIENT
Start: 2023-05-18 | End: 2023-05-18 | Stop reason: SDUPTHER

## 2023-05-18 RX ORDER — VALSARTAN 320 MG/1
320 TABLET ORAL DAILY
Qty: 90 TABLET | Refills: 12 | Status: SHIPPED | OUTPATIENT
Start: 2023-05-18 | End: 2026-07-31

## 2023-05-18 NOTE — TELEPHONE ENCOUNTER
Care Due:                  Date            Visit Type   Department     Provider  --------------------------------------------------------------------------------                                MYCHART                              ANNUAL       [facility]                              CHECKUP/PHY  MED/ INTERNAL  Rex Robbins  Last Visit: 07-      S            MED/ PEDS      [place]  Next Visit: None Scheduled  None         None Found                                                            Last  Test          Frequency    Reason                     Performed    Due Date  --------------------------------------------------------------------------------    Office Visit  12 months..  hydroCHLOROthiazide,       07- 07-                             valsartan................    CMP.........  12 months..  hydroCHLOROthiazide,       08- 07-                             valsartan................    [employer] Embedded Care Due Messages. Reference number: [id number].   5/18/2023 7:20:29 AM CDT

## 2023-06-14 ENCOUNTER — OFFICE VISIT (OUTPATIENT)
Dept: PSYCHIATRY | Facility: CLINIC | Age: 53
End: 2023-06-14
Payer: COMMERCIAL

## 2023-06-14 VITALS
HEART RATE: 80 BPM | WEIGHT: 169.44 LBS | DIASTOLIC BLOOD PRESSURE: 90 MMHG | SYSTOLIC BLOOD PRESSURE: 137 MMHG | HEIGHT: 69 IN | BODY MASS INDEX: 25.1 KG/M2

## 2023-06-14 DIAGNOSIS — G47.00 INSOMNIA, UNSPECIFIED TYPE: ICD-10-CM

## 2023-06-14 DIAGNOSIS — F43.21 GRIEF: ICD-10-CM

## 2023-06-14 DIAGNOSIS — F43.23 ADJUSTMENT DISORDER WITH MIXED ANXIETY AND DEPRESSED MOOD: Primary | ICD-10-CM

## 2023-06-14 PROCEDURE — 4010F ACE/ARB THERAPY RXD/TAKEN: CPT | Mod: CPTII,S$GLB,,

## 2023-06-14 PROCEDURE — 4010F PR ACE/ARB THEARPY RXD/TAKEN: ICD-10-PCS | Mod: CPTII,S$GLB,,

## 2023-06-14 PROCEDURE — 1160F PR REVIEW ALL MEDS BY PRESCRIBER/CLIN PHARMACIST DOCUMENTED: ICD-10-PCS | Mod: CPTII,S$GLB,,

## 2023-06-14 PROCEDURE — 3008F BODY MASS INDEX DOCD: CPT | Mod: CPTII,S$GLB,,

## 2023-06-14 PROCEDURE — 1159F PR MEDICATION LIST DOCUMENTED IN MEDICAL RECORD: ICD-10-PCS | Mod: CPTII,S$GLB,,

## 2023-06-14 PROCEDURE — 99214 OFFICE O/P EST MOD 30 MIN: CPT | Mod: S$GLB,,,

## 2023-06-14 PROCEDURE — 3008F PR BODY MASS INDEX (BMI) DOCUMENTED: ICD-10-PCS | Mod: CPTII,S$GLB,,

## 2023-06-14 PROCEDURE — 3075F SYST BP GE 130 - 139MM HG: CPT | Mod: CPTII,S$GLB,,

## 2023-06-14 PROCEDURE — 3044F HG A1C LEVEL LT 7.0%: CPT | Mod: CPTII,S$GLB,,

## 2023-06-14 PROCEDURE — 3044F PR MOST RECENT HEMOGLOBIN A1C LEVEL <7.0%: ICD-10-PCS | Mod: CPTII,S$GLB,,

## 2023-06-14 PROCEDURE — 3075F PR MOST RECENT SYSTOLIC BLOOD PRESS GE 130-139MM HG: ICD-10-PCS | Mod: CPTII,S$GLB,,

## 2023-06-14 PROCEDURE — 3080F PR MOST RECENT DIASTOLIC BLOOD PRESSURE >= 90 MM HG: ICD-10-PCS | Mod: CPTII,S$GLB,,

## 2023-06-14 PROCEDURE — 1160F RVW MEDS BY RX/DR IN RCRD: CPT | Mod: CPTII,S$GLB,,

## 2023-06-14 PROCEDURE — 99999 PR PBB SHADOW E&M-EST. PATIENT-LVL III: CPT | Mod: PBBFAC,,,

## 2023-06-14 PROCEDURE — 1159F MED LIST DOCD IN RCRD: CPT | Mod: CPTII,S$GLB,,

## 2023-06-14 PROCEDURE — 99214 PR OFFICE/OUTPT VISIT, EST, LEVL IV, 30-39 MIN: ICD-10-PCS | Mod: S$GLB,,,

## 2023-06-14 PROCEDURE — 3080F DIAST BP >= 90 MM HG: CPT | Mod: CPTII,S$GLB,,

## 2023-06-14 PROCEDURE — 99999 PR PBB SHADOW E&M-EST. PATIENT-LVL III: ICD-10-PCS | Mod: PBBFAC,,,

## 2023-06-14 RX ORDER — PROPRANOLOL HYDROCHLORIDE 10 MG/1
10 TABLET ORAL 3 TIMES DAILY
Qty: 270 TABLET | Refills: 1 | Status: SHIPPED | OUTPATIENT
Start: 2023-06-14 | End: 2023-11-21 | Stop reason: SDUPTHER

## 2023-06-14 RX ORDER — CITALOPRAM 20 MG/1
30 TABLET, FILM COATED ORAL DAILY
Qty: 135 TABLET | Refills: 1 | Status: SHIPPED | OUTPATIENT
Start: 2023-06-14 | End: 2023-11-21 | Stop reason: SDUPTHER

## 2023-06-14 NOTE — PROGRESS NOTES
"Outpatient Psychiatry Follow-Up Visit   6/14/2023    Clinical Status of Patient:  Outpatient (Ambulatory)    Chief Complaint:  Paula Rodriguez is a 53 y.o. female who presents today for follow-up of depression and anxiety.  Met with patient.      Interval History and Content of Current Session 06/14/2023:  Pt is A+Ox 4.  Patients mood is "good", affect appears congruent and appropriate. Pts thought process is normal and logical.  Pts speech is normal tone, normal rate, normal pitch, normal volume   Linear and logical, friendly and cooperative, normal eye contact, no psychomotor retardation.  Pt is calmly seated in chair during interview.     Patient states that she has been doing well since our last appointment. Patient states that she got back from [place] one week ago and had a fantastic time visiting family. Patient states that her anxiety and depression are well controlled at this time. Patient states she was seeing Latisha for talk therapy, states that she does not think she needs therapy at this time.  Patient informed she can always return if needed.  Patient states that she took up a yoga class, states that she feels better when exercising/staying active.  Patient states that she started up her garden again, excited about growing okra.  Patient appears to be well, requesting refills    Reports depression today as 1/10, and anxiety as 2/10.  Pt reports taking medications as prescribed and behaving appropriately during interview today.  Denies SI/HI/AVH. Denies side effects of medications.  Pt reports sleeping well and normal appetite.     Denies recreational drug use. Pt has decreased alcohol consumption.  Pt states that she will continue to work on her drinking.  State sthat her goal is to limit drinking socially.  Pt has stopped smoking, denies Vaping, 2 cups of coffee Caffeine.  CAGE 2/4.      Interim Events: 5/3/2023  Pt is A+Ox 4.  Patients mood is "better", affect appears congruent and appropriate. Pts " "thought process is normal and logical.  Pts speech is normal tone, normal rate, normal pitch, normal volume   Linear and logical, friendly and cooperative, good eye contact, no psychomotor retardation.  Pt is calmly seated in chair during interview.     Patient states that she's been doing better since previous appointment. Patient states that her anxiety and depression are more controlled. Patient is more active previously, spending time in her garden every day. Patient went to [place] and spread her  's ashes ocean with family nearby. Patient states that she was crying happy tears.  patient will be visiting family in [place] in may. Patient states that she is excited to see them.     Reports depression today as 3/10, and anxiety as 3/10.  Pt reports taking medications as prescribed and behaving appropriately during interview today.  Denies SI/HI/AVH. Denies side effects of medications.  Pt reports sleeping well and normal appetite.     Denies recreational drug use. Pt has decreased alcohol consumption.  Pt states that she will continue to work on her drinking.  Pt reports 25 drinks per week.  Pt has stopped smoking, denies Vaping, 2 cups of coffee Caffeine.  CAGE 2/4.        Interim Events: 4/3/2023    Pt is A+Ox 4.  Patients mood is "ok", affect appears blunted, sad. Pts thought process is normal and logical.  Pts speech is normal tone, normal rate, normal pitch, normal volume   Linear and logical, friendly and cooperative, poor eye contact, no psychomotor retardation.  Pt is tearful during interview.     Patient states that she has been doing OK since her last appointment. Patient states that her anniversary was two weeks ago ,states that since then her mood has been low. Patient continued to endorse heavy drinking, 5 drinks per night. Patient states that she understands her drinking is becoming an issue, currently is not interested in medications that can cut cravings. Patient encouraged to decrease " "alcohol consumption as this affects mood. Patient states that she is planning on releasing her late 's ashes Mid-May 2023 in [place].     Reports depression today as 4/10, and anxiety as 5/10.  Pt reports taking medications as prescribed and behaving appropriately during interview today.  Denies SI/HI/AVH. Denies side effects of medications.  Pt reports sleeping well and normal appetite.     Denies recreational drug use. Pt reports 35 drinks per week, 0.5ppd tobacco use, denies Vaping, 2 cups of coffee Caffeine.  CAGE 2/4.          Interim events: 03/02/2023  Pt is A+Ox 4.  Patients mood is "better", affect appears appropriate, sad. Pts thought process is normal and logical.  Pts speech is normal tone, normal rate, normal pitch, normal volume   Linear and logical, friendly and cooperative, good eye contact, no psychomotor retardation.  Pt is calmly seated in chair during interview. Pt is casually dressed and well groomed.  Pt is not tearful during interview    Patient states that her anxiety and depression improved since starting Celexa, patient is currently taking Celexa 10mg daily. Pt states that she has cut back on her drinking, currently reports drinking 14 beers a week. Patient states that she has stopped drinking hard liquor. During the last two weeks Pt reports hanging out with friends which has help distract her. Patient also states that she has begun gardening again.    Reports depression today as 4/10, and anxiety as 2/10.    Pt reports taking medications as prescribed and behaving appropriately during interview today.  Denies SI/HI/AVH. Denies side effects of medications.  Pt reports sleeping well and normal appetite.     Denies recreational drug use. Pt reports 14 drinks per week, 0.5ppd tobacco use, denies Vaping, 2 cups of coffee Caffeine.  CAGE 2/4.        Prior visit :  Psych Interview 02/16/2023:   Paula Rodriguez is a 52 y.o. female with past psychiatric history of depression  presented to " "for initial evaluation and treatment for depression and anxiety.     Pt is A+Ox 4.  Patients mood is "not good", affect appears guarded, sad, anxious. Pts thought process is normal and logical.  Pts speech is normal tone, normal rate, normal pitch, normal volume   Linear and logical, cooperative, poor eye contact, psychomotor retardation.  Pt is tearful during interview.  Pt is casually dressed and well groomed.       States that she has been taking lexapro for 15 years for depression.  States that she was well controlled until her  unexpectedly  in 2022.  States that she has not been able to grieve his passing.  States that she is from [facility] and all of her family lives there.  States that she does have local friends who she can talk to.  States that she has looked into group therapy, but was unsuccessful.  States that she is interested in talk therapy at this time.  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  States that she enjoys her work but works 3 days a week from home.  States that being home is not good for her mental health and she has been going on walks with her dog to get out the house.       Denies prior hx of psychiatric hospitalizations. Denies hx of suicide attempts. Pt denies hx self harm. Pt denies hx hallucinations.  Pt denies hx of eating disorders.   Pt denies hx trauma. Denies physical/sexual abuse. Pt denies symptoms including nightmares, hypervigilance, flashbacks, avoidance behaviors, and disassociation.     Reports depression today as 6/10, and anxiety as 4/10.  Reports sleeping 8 hrs per night, and poor appetite. States that she consumes 5 drinks every night to go to sleep.  State sthat she wants to stop drinks.  States that she has used drinking to cope with her husbands passing.  Lost 20lbs over this last year.    Denies SI/HI/AVH. Denies side effects of medications.     Pt states that there support consists of friends and coworkers.     "   Denies recreational drug use. Pt reports 35 drinks per week, 0.5ppd tobacco use, denies Vaping, 2 cups of coffee Caffeine.  CAGE 2/4.       Current Medication:  Lexapro 20mg daily  Ambien 10mg nightly        DX:  The patient complained of depressed mood with lethargy, decreased appetite , insomnia, psycho-motor retardation, anhedonia, apathy, worsening self-esteem, guilt, decreased concentration and ability to make decisions, and thoughts of suicide.      Pt denies hx symptoms/episodes of cathleen.     Admits to symptoms of anxiety including excessive anxiety/worry/fear, more days than not, about numerous issues, difficulty controlling the worry, over thinking, rumination, restlessness, poor concentration, fatigue, and increased irritability. Denies panic attacks at this time.     Past Psychiatric History:   Previous Psychiatric Hospitalizations: NO  Previous Medication Trials: YES:      History of psychotherapy:  NO  Previous Suicide Attempts: NO  History of Violence:  NO  History of physical/sexual abuse: NO  Outpatient psychiatric provider(past): NO     Substance Abuse History:   Tobacco: NO  Alcohol: YES:      Illicit Substances: NO  Detox/Rehab: NO     Neurological History:   Seizures: NO  Head trauma: NO     Family Psychiatric History: Yes -   Father - depression     Social History:  Developmental/Childhood:Achieved all developmental milestones timely  *Education:Bachelor's Degree  Employment Status/Finances:Employed   Relationship Status/Sexual Orientation:    Children: 0  Housing Status: Home    history:  NO  Access to gun: YES:      [employer]beterian: agnostic  Recreational activities: gardening   Person patient is closest to/confides in: friends     Legal History:   Past Charges/Incarcerations:  No      Review of Systems     Review of Systems   Constitutional:  Negative for weight loss.   HENT:  Negative for tinnitus.    Eyes:  Negative for blurred vision.   Respiratory:  Negative for cough and  shortness of breath.    Cardiovascular:  Negative for chest pain.   Gastrointestinal:  Negative for abdominal pain.   Genitourinary:  Negative for dysuria.   Musculoskeletal:  Negative for back pain and neck pain.   Skin:  Negative for rash.   Neurological:  Negative for dizziness, seizures and weakness.   Psychiatric/Behavioral:  Negative for depression, hallucinations, memory loss, substance abuse and suicidal ideas. The patient is not nervous/anxious and does not have insomnia.      Psychiatric Review Of Systems - Is patient experiencing or having changes in:  sleep: no  appetite: no  weight: no  energy/anergy: no  interest/pleasure/anhedonia: no  somatic symptoms: no  libido: no  anxiety/panic: no  guilty/hopelessness: no  concentration: no  S.I.B.s/risky behavior: no  Irritability: no  Racing thoughts: no  Impulsive behaviors: no  Paranoia: no  AVH: no      Past Medical, Family and Social History: The patient's past medical, family and social history have been reviewed and updated as appropriate within the electronic medical record - see encounter notes.      Current Medications:   Medication List with Changes/Refills   Current Medications    HYDROCHLOROTHIAZIDE (HYDRODIURIL) 12.5 MG TAB    Take 1 tablet (12.5 mg total) by mouth once daily.    MECLIZINE (ANTIVERT) 25 MG TABLET    Take 1 tablet (25 mg total) by mouth 3 (three) times daily as needed for Dizziness (or at least one HS for 1 week when vertigo active).    TRAZODONE (DESYREL) 50 MG TABLET    Take 1 tablet (50 mg total) by mouth every evening.    VALSARTAN (DIOVAN) 320 MG TABLET    Take 1 tablet (320 mg total) by mouth once daily.   Changed and/or Refilled Medications    Modified Medication Previous Medication    CITALOPRAM (CELEXA) 20 MG TABLET citalopram (CELEXA) 20 MG tablet       Take 1.5 tablets (30 mg total) by mouth once daily.    Take 1.5 tablets (30 mg total) by mouth once daily.    PROPRANOLOL (INDERAL) 10 MG TABLET propranoloL (INDERAL) 10  "MG tablet       Take 1 tablet (10 mg total) by mouth 3 (three) times daily.    Take 1 tablet (10 mg total) by mouth 3 (three) times daily.         Allergies:   Review of patient's allergies indicates:  No Known Allergies      Vitals   Vitals:    06/14/23 0856   BP: (!) 137/90   Pulse: 80                Labs/Imaging/Studies:   No results found for this or any previous visit (from the past 48 hour(s)).   No results found for: PHENYTOIN, PHENOBARB, VALPROATE, CBMZ    Compliance: yes    Side effects: None    Risk Parameters:  Patient reports no suicidal ideation  Patient reports no homicidal ideation  Patient reports no self-injurious behavior  Patient reports no violent behavior    Exam (detailed: at least 9 elements; comprehensive: all 15 elements)   Constitutional  Vitals:  Most recent vital signs, dated less than 90 days prior to this appointment, were reviewed.   Vitals:    06/14/23 0856   BP: (!) 137/90   Pulse: 80   Weight: 76.9 kg (169 lb 6.8 oz)   Height: 5' 9" (1.753 m)              General:  unremarkable, age appropriate, casually dressed, neatly groomed     Musculoskeletal  Muscle Strength/Tone:  no spasicity, no rigidity, no cogwheeling, no flaccidity, no paratonia, no dyskinesia, no dystonia, no tremor, no tic, no choreoathetosis, no atrophy   Gait & Station:  non-ataxic     Psychiatric  Speech:  no latency; no press   Mood & Affect:  steady  congruent and appropriate   Thought Process:  normal and logical   Associations:  intact   Thought Content:  normal, no suicidality, no homicidality, delusions, or paranoia   Insight:  intact, has awareness of illness   Judgement: behavior is adequate to circumstances, age appropriate   Orientation:  grossly intact, person, place, situation, time/date   Memory: intact for content of interview, grossly intact, memory >3 objects at five mins   Language: grossly intact, able to name, able to repeat   Attention Span & Concentration:  able to focus, completed tasks   Fund " of Knowledge:  intact and appropriate to age and level of education, familiar with aspects of current personal life     Assessment and Diagnosis   Status/Progress: Based on the examination today, the patient's problem(s) is/are resolving.  New problems have not been presented today.       General Impression:      ICD-10-CM ICD-9-CM   1. Adjustment disorder with mixed anxiety and depressed mood  F43.23 309.28   2. Grief  F43.21 309.0   3. Insomnia, unspecified type  G47.00 780.52             Intervention/Counseling/Treatment Plan   Mood  Continue Celexa 30mg daily - anxiety and depression    Continue Propanolol 10mg TID PRN anxiety   Pt extensively warned of the risks of developing hypotension while taking this medications.  Symptoms include lightheadedness, dizziness, feeling of passing out.  Pt instructed to stop taking this med if she experiences any of the symptoms.  Continue Trazodone 50mg nightly- sleep    At this time Pt is not a candidate for Benzos due to heavy drinking.     Pt was extensively educated in the importance of making and keeping a talk therapy appointment.         Discussed diagnosis, risks and benefits of proposed treatment above vs alternative treatments vs no treatment, and potential side effects of these treatments, and the inherent unpredictability of individual response to treatment.  The patient expresses understanding and gives informed consent to pursue treatment.  The potential benefits outweigh the potential risks. Patient has no other questions. Risks/adverse effects discussed at this time including but not limited to: GI side effects, sexual dysfunction, activation vs sedation, triggering of suicidal thoughts, and serotonin syndrome.    Serotonin syndrome   Mental status changes can include anxiety, restlessness, disorientation, and agitated delirium.    Autonomic manifestations can include diaphoresis, tachycardia, hyperthermia, hypertension, vomiting, and diarrhea   Neuromuscular  hyperactivity can manifest as tremor, myoclonus, hyperreflexia, rigidity, hyperthermia, seizure, and bilateral Babinski sign.   Pt was informed that if they experience any of these symptoms to go the ED.     Difficulty Sleeping Behavioral Modification:  Implement stimulus control: Brooks bedroom for sleep only. Leave bedroom when frustrated from not sleeping. Engage in relaxation before returning. Engage in activities during the day. AVOID >7-8 h time in bed  Avoid clock watching  Avoid thinking/worrying about sleep when trying to fall asleep  Limit caffeinated consumption  Make sure the bedroom is dark, quiet and cool    Safety Plan   Patient voices understanding and agreement with this plan  Provided crisis numbers  Encouraged patient to keep future appointments.  Instructed patient to call or message with questions or concerns  In the event of an emergency, including suicidal ideation, patient was advised to go to the emergency room and/or call 911    Return to Clinic: 1 month - before going on vacation     Psychotherapy:  Target symptoms: depression, anxiety   Why chosen therapy is appropriate versus another modality: relevant to diagnosis, evidence based practice  Outcome monitoring methods: self-report, observation  Therapeutic intervention type: insight oriented psychotherapy, supportive psychotherapy  Topics discussed/themes: illness/death of a loved one, difficulty managing affect in interpersonal relationships, building skills sets for symptom management, symptom recognition  The patient's response to the intervention is accepting. The patient's progress toward treatment goals is good.   Duration of intervention: 15 minutes.    Total face to face time: 30 min  Total time (chart review, patient contact, documentation): 36 min    A diagnostic psychiatric evaluation was performed and responsiveness to treatment was assessed.  The patient demonstrates robust ability/capacity to respond to  treatment.    Eron Amaya PA-C    *This note has been prepared using a combination of a dictation device and typing.  It has been checked for errors but some errors may still exist within the note as a result of speech recognition errors and/or typographical errors.

## 2023-07-19 ENCOUNTER — OFFICE VISIT (OUTPATIENT)
Dept: FAMILY MEDICINE | Facility: CLINIC | Age: 53
End: 2023-07-19
Payer: COMMERCIAL

## 2023-07-19 VITALS
DIASTOLIC BLOOD PRESSURE: 72 MMHG | OXYGEN SATURATION: 99 % | WEIGHT: 174.38 LBS | SYSTOLIC BLOOD PRESSURE: 126 MMHG | HEIGHT: 69 IN | TEMPERATURE: 99 F | HEART RATE: 69 BPM | BODY MASS INDEX: 25.83 KG/M2

## 2023-07-19 DIAGNOSIS — Z00.00 ROUTINE MEDICAL EXAM: Primary | ICD-10-CM

## 2023-07-19 DIAGNOSIS — I10 ESSENTIAL HYPERTENSION: ICD-10-CM

## 2023-07-19 DIAGNOSIS — Z12.31 ENCOUNTER FOR SCREENING MAMMOGRAM FOR BREAST CANCER: ICD-10-CM

## 2023-07-19 DIAGNOSIS — Z12.12 SCREENING FOR COLORECTAL CANCER: ICD-10-CM

## 2023-07-19 DIAGNOSIS — F41.9 ANXIETY DISORDER, UNSPECIFIED TYPE: ICD-10-CM

## 2023-07-19 DIAGNOSIS — Z63.4 BEREAVEMENT: ICD-10-CM

## 2023-07-19 DIAGNOSIS — F39 MOOD DISORDER: ICD-10-CM

## 2023-07-19 DIAGNOSIS — K76.0 FATTY LIVER: ICD-10-CM

## 2023-07-19 DIAGNOSIS — D75.89 MACROCYTOSIS: ICD-10-CM

## 2023-07-19 DIAGNOSIS — G47.00 INSOMNIA, UNSPECIFIED TYPE: ICD-10-CM

## 2023-07-19 DIAGNOSIS — Z12.11 SCREENING FOR COLORECTAL CANCER: ICD-10-CM

## 2023-07-19 PROCEDURE — 3074F SYST BP LT 130 MM HG: CPT | Mod: CPTII,S$GLB,, | Performed by: INTERNAL MEDICINE

## 2023-07-19 PROCEDURE — 3078F DIAST BP <80 MM HG: CPT | Mod: CPTII,S$GLB,, | Performed by: INTERNAL MEDICINE

## 2023-07-19 PROCEDURE — 99396 PR PREVENTIVE VISIT,EST,40-64: ICD-10-PCS | Mod: S$GLB,,, | Performed by: INTERNAL MEDICINE

## 2023-07-19 PROCEDURE — 3074F PR MOST RECENT SYSTOLIC BLOOD PRESSURE < 130 MM HG: ICD-10-PCS | Mod: CPTII,S$GLB,, | Performed by: INTERNAL MEDICINE

## 2023-07-19 PROCEDURE — 3078F PR MOST RECENT DIASTOLIC BLOOD PRESSURE < 80 MM HG: ICD-10-PCS | Mod: CPTII,S$GLB,, | Performed by: INTERNAL MEDICINE

## 2023-07-19 PROCEDURE — 4010F ACE/ARB THERAPY RXD/TAKEN: CPT | Mod: CPTII,S$GLB,, | Performed by: INTERNAL MEDICINE

## 2023-07-19 PROCEDURE — 99396 PREV VISIT EST AGE 40-64: CPT | Mod: S$GLB,,, | Performed by: INTERNAL MEDICINE

## 2023-07-19 PROCEDURE — 3008F BODY MASS INDEX DOCD: CPT | Mod: CPTII,S$GLB,, | Performed by: INTERNAL MEDICINE

## 2023-07-19 PROCEDURE — 1159F PR MEDICATION LIST DOCUMENTED IN MEDICAL RECORD: ICD-10-PCS | Mod: CPTII,S$GLB,, | Performed by: INTERNAL MEDICINE

## 2023-07-19 PROCEDURE — 3008F PR BODY MASS INDEX (BMI) DOCUMENTED: ICD-10-PCS | Mod: CPTII,S$GLB,, | Performed by: INTERNAL MEDICINE

## 2023-07-19 PROCEDURE — 1159F MED LIST DOCD IN RCRD: CPT | Mod: CPTII,S$GLB,, | Performed by: INTERNAL MEDICINE

## 2023-07-19 PROCEDURE — 99999 PR PBB SHADOW E&M-EST. PATIENT-LVL III: CPT | Mod: PBBFAC,,, | Performed by: INTERNAL MEDICINE

## 2023-07-19 PROCEDURE — 99999 PR PBB SHADOW E&M-EST. PATIENT-LVL III: ICD-10-PCS | Mod: PBBFAC,,, | Performed by: INTERNAL MEDICINE

## 2023-07-19 PROCEDURE — 3044F HG A1C LEVEL LT 7.0%: CPT | Mod: CPTII,S$GLB,, | Performed by: INTERNAL MEDICINE

## 2023-07-19 PROCEDURE — 3044F PR MOST RECENT HEMOGLOBIN A1C LEVEL <7.0%: ICD-10-PCS | Mod: CPTII,S$GLB,, | Performed by: INTERNAL MEDICINE

## 2023-07-19 PROCEDURE — 4010F PR ACE/ARB THEARPY RXD/TAKEN: ICD-10-PCS | Mod: CPTII,S$GLB,, | Performed by: INTERNAL MEDICINE

## 2023-07-19 RX ORDER — HYDROCHLOROTHIAZIDE 12.5 MG/1
12.5 TABLET ORAL DAILY
Qty: 90 TABLET | Refills: 3 | Status: SHIPPED | OUTPATIENT
Start: 2023-07-19 | End: 2024-07-18

## 2023-07-19 SDOH — SOCIAL DETERMINANTS OF HEALTH (SDOH): DISSAPEARANCE AND DEATH OF FAMILY MEMBER: Z63.4

## 2023-07-19 NOTE — PROGRESS NOTES
Chief complaint physical    53-year-old white female still active .  She is working for home due to the pandemic.  Her 's retired and then committed suicide.   At the end of the day it is hard for her to wind down.  She does have a couple of drinks to help her relax.  She then has trouble falling asleep but not related to the alcohol.  She tolerated Ambien in the past without any problems.  We discussed other potential side effects of Ambien.  She has had no withdrawal the next day.  She will use it with caution if drinking but never had any issues in the past.  She does feel anxieties fairly under control and not interfering with function on the Lexapro 20.  We discussed possible addition of BuSpar, Remeron, trazodone does may have all of the side effects.  Primary issue with Ambien was limited covered by insurance.  She did see psychiatrist looks like she now on trazodone.  She went to [place] and ate well as expected but only 5 lb up compared to year prior..    Labs reviewed from April and unremarkable but somehow CMP not done.  No issues with CMP in the past other than one time and ALT was up.  We will make sure we get that CMP with next blood work.    She did actually have a normal colonoscopy with Dr. Bansal 2021.  She was told come back in 10 years     she is walking her dog every day.      Blood pressure running well at home.  MCV slightly up.  B12 ok,  may represent the alcohol intake.    CONST: weight stable. EYES: no vision change. ENT: no sore throat. CV: no chest pain w/ exertion. RESP: no shortness of breath. GI: no nausea, vomiting, diarrhea. No dysphagia. : no urinary issues. MUSCULOSKELETAL: no new myalgias or arthralgias. SKIN: no new changes. NEURO: no focal deficits. PSYCH: no new issues. ENDOCRINE: no polyuria. HEME: no lymph nodes. ALLERGY: no general pruritis.     PAST MEDICAL HISTORY:    1. Anxiety/insomnia -Lexapro, occasional use of Ambien  2.  Hypertension formally diagnosed    3. Mammo per Gyn at [facility].   4.  Nail dermatophytosis    5.  Isolated ALT elevation resolved - fatty liver    6. Normal colonoscopy 2021, 10 years, Dr. Bansal                                                                                                                      PAST SURGICAL HISTORY:  None.                                                                                                                             SOCIAL HISTORY:  . Former smoker.  Quit in  and smoked for 10 years        prior.  She drinks alcohol on the weekends.                                                                                                               FAMILY HISTORY:  Mom  with no problems.  Father  last year at 76    of lung cancer and was a nonsmoker but was a book keeper in [place].    One sister in 40s in good health.        Gen: no distress  EYES: conjunctiva clear, non-icteric, PERRL  ENT: nose clear, nasal mucosa normal, oropharynx clear and moist, teeth good  NECK:supple, thyroid non-palpable  RESP: effort is good, lungs clear  CV: heart RRR w/o murmur, gallops or rubs; no carotid bruits, no edema  GI: abdomen soft, non-distended, non-tender, no hepatosplenomegaly  MS: gait normal, no clubbing or cyanosis of the digits  SKIN: no rashes, warm to touch         Diagnoses and all orders for this visit:    Routine medical exam, up-to-date on breast and colon cancer screening.  Labs unremarkable.    Encounter for screening mammogram for breast cancer    Screening for colorectal cancer    Essential hypertension  -     hydroCHLOROthiazide (HYDRODIURIL) 12.5 MG Tab; Take 1 tablet (12.5 mg total) by mouth once daily.    Anxiety disorder, unspecified type, chronic and stable    Mood disorder, chronic and stable    Insomnia, unspecified type, chronic and stable    Bereavement, improving    Fatty liver, elevated ALT once    Macrocytosis, chronic and stable

## 2023-07-25 ENCOUNTER — PATIENT MESSAGE (OUTPATIENT)
Dept: ADMINISTRATIVE | Facility: HOSPITAL | Age: 53
End: 2023-07-25
Payer: COMMERCIAL

## 2023-07-25 ENCOUNTER — PATIENT OUTREACH (OUTPATIENT)
Dept: ADMINISTRATIVE | Facility: HOSPITAL | Age: 53
End: 2023-07-25
Payer: COMMERCIAL

## 2023-11-15 ENCOUNTER — TELEPHONE (OUTPATIENT)
Dept: PSYCHIATRY | Facility: CLINIC | Age: 53
End: 2023-11-15
Payer: COMMERCIAL

## 2023-11-21 ENCOUNTER — OFFICE VISIT (OUTPATIENT)
Dept: PSYCHIATRY | Facility: CLINIC | Age: 53
End: 2023-11-21
Payer: COMMERCIAL

## 2023-11-21 VITALS
BODY MASS INDEX: 26.4 KG/M2 | TEMPERATURE: 99 F | HEART RATE: 81 BPM | HEIGHT: 69 IN | WEIGHT: 178.25 LBS | SYSTOLIC BLOOD PRESSURE: 161 MMHG | DIASTOLIC BLOOD PRESSURE: 106 MMHG | OXYGEN SATURATION: 99 %

## 2023-11-21 DIAGNOSIS — F43.23 ADJUSTMENT DISORDER WITH MIXED ANXIETY AND DEPRESSED MOOD: Primary | ICD-10-CM

## 2023-11-21 DIAGNOSIS — G47.00 INSOMNIA, UNSPECIFIED TYPE: ICD-10-CM

## 2023-11-21 DIAGNOSIS — F43.21 GRIEF: ICD-10-CM

## 2023-11-21 PROCEDURE — 3044F PR MOST RECENT HEMOGLOBIN A1C LEVEL <7.0%: ICD-10-PCS | Mod: CPTII,S$GLB,,

## 2023-11-21 PROCEDURE — 4010F PR ACE/ARB THEARPY RXD/TAKEN: ICD-10-PCS | Mod: CPTII,S$GLB,,

## 2023-11-21 PROCEDURE — 99999 PR PBB SHADOW E&M-EST. PATIENT-LVL III: ICD-10-PCS | Mod: PBBFAC,,,

## 2023-11-21 PROCEDURE — 1159F PR MEDICATION LIST DOCUMENTED IN MEDICAL RECORD: ICD-10-PCS | Mod: CPTII,S$GLB,,

## 2023-11-21 PROCEDURE — 3080F DIAST BP >= 90 MM HG: CPT | Mod: CPTII,S$GLB,,

## 2023-11-21 PROCEDURE — 99999 PR PBB SHADOW E&M-EST. PATIENT-LVL III: CPT | Mod: PBBFAC,,,

## 2023-11-21 PROCEDURE — 99214 OFFICE O/P EST MOD 30 MIN: CPT | Mod: S$GLB,,,

## 2023-11-21 PROCEDURE — 1159F MED LIST DOCD IN RCRD: CPT | Mod: CPTII,S$GLB,,

## 2023-11-21 PROCEDURE — 4010F ACE/ARB THERAPY RXD/TAKEN: CPT | Mod: CPTII,S$GLB,,

## 2023-11-21 PROCEDURE — 1160F RVW MEDS BY RX/DR IN RCRD: CPT | Mod: CPTII,S$GLB,,

## 2023-11-21 PROCEDURE — 3080F PR MOST RECENT DIASTOLIC BLOOD PRESSURE >= 90 MM HG: ICD-10-PCS | Mod: CPTII,S$GLB,,

## 2023-11-21 PROCEDURE — 3008F BODY MASS INDEX DOCD: CPT | Mod: CPTII,S$GLB,,

## 2023-11-21 PROCEDURE — 3077F PR MOST RECENT SYSTOLIC BLOOD PRESSURE >= 140 MM HG: ICD-10-PCS | Mod: CPTII,S$GLB,,

## 2023-11-21 PROCEDURE — 3008F PR BODY MASS INDEX (BMI) DOCUMENTED: ICD-10-PCS | Mod: CPTII,S$GLB,,

## 2023-11-21 PROCEDURE — 99214 PR OFFICE/OUTPT VISIT, EST, LEVL IV, 30-39 MIN: ICD-10-PCS | Mod: S$GLB,,,

## 2023-11-21 PROCEDURE — 3044F HG A1C LEVEL LT 7.0%: CPT | Mod: CPTII,S$GLB,,

## 2023-11-21 PROCEDURE — 3077F SYST BP >= 140 MM HG: CPT | Mod: CPTII,S$GLB,,

## 2023-11-21 PROCEDURE — 1160F PR REVIEW ALL MEDS BY PRESCRIBER/CLIN PHARMACIST DOCUMENTED: ICD-10-PCS | Mod: CPTII,S$GLB,,

## 2023-11-21 RX ORDER — NALTREXONE HYDROCHLORIDE 50 MG/1
50 TABLET, FILM COATED ORAL DAILY
Qty: 30 TABLET | Refills: 2 | Status: SHIPPED | OUTPATIENT
Start: 2023-11-21 | End: 2024-02-19

## 2023-11-21 RX ORDER — CITALOPRAM 20 MG/1
30 TABLET, FILM COATED ORAL DAILY
Qty: 135 TABLET | Refills: 3 | Status: SHIPPED | OUTPATIENT
Start: 2023-11-21 | End: 2024-11-15

## 2023-11-21 RX ORDER — BUSPIRONE HYDROCHLORIDE 5 MG/1
5 TABLET ORAL 3 TIMES DAILY
Qty: 270 TABLET | Refills: 3 | Status: SHIPPED | OUTPATIENT
Start: 2023-11-21 | End: 2024-11-20

## 2023-11-21 RX ORDER — PROPRANOLOL HYDROCHLORIDE 10 MG/1
10 TABLET ORAL 3 TIMES DAILY
Qty: 270 TABLET | Refills: 3 | Status: SHIPPED | OUTPATIENT
Start: 2023-11-21 | End: 2024-11-15

## 2023-11-21 RX ORDER — TRAZODONE HYDROCHLORIDE 50 MG/1
50 TABLET ORAL NIGHTLY
Qty: 90 TABLET | Refills: 3 | Status: SHIPPED | OUTPATIENT
Start: 2023-11-21 | End: 2024-11-15

## 2023-11-21 NOTE — PROGRESS NOTES
"Outpatient Psychiatry Follow-Up Visit   11/21/2023    Clinical Status of Patient:  Outpatient (Ambulatory)    Chief Complaint:  Paula Rodriguez is a 53 y.o. female who presents today for follow-up of depression and anxiety.  Met with patient.      Interval History and Content of Current Session 11/21/2023:  Pt is A+Ox 4.  Patients mood is "good", affect appears congruent and appropriate. Pts thought process is normal and logical.  Pts speech is normal tone, normal rate, normal pitch, normal volume   Linear and logical, friendly and cooperative, normal eye contact, no psychomotor retardation.  Pt is calmly seated in chair during interview.     Patient states that she has been doing well since our last appointment. Take all medications as prescribed. Endorses an increase in anxiety due to the holidays and a cruise planned in early December. Patient states that she intends to spend the holidays with local friends. Patient came back from [facility] a couple weeks ago, states that the trip went well and she is excited to go back. Patient endorses interest in naltrexone, we discussed this medication in the past patient states that she is ready to try it. States that she will start medication after the holidays. Currently patient drinks 5 beers a day. States that her mood is better when she does not drink. Patients requesting refills.    Pt reports taking medications as prescribed and behaving appropriately during interview today.  Denies SI/HI/AVH. Denies side effects of medications.  Pt reports sleeping ok and normal appetite.   Denies recreational drug use. Pt has decreased alcohol consumption.  Pt states that she will continue to work on her drinking.  State sthat her goal is to limit drinking socially. Drinks 5 beers a day. CAGE 2/4.    Pt has stopped smoking, denies Vaping, 2 cups of coffee Caffeine.        Interim Events: 6/14/2023  Pt is A+Ox 4.  Patients mood is "good", affect appears congruent and appropriate. Pts " "thought process is normal and logical.  Pts speech is normal tone, normal rate, normal pitch, normal volume   Linear and logical, friendly and cooperative, normal eye contact, no psychomotor retardation.  Pt is calmly seated in chair during interview.     Patient states that she has been doing well since our last appointment. Patient states that she got back from [place] one week ago and had a fantastic time visiting family. Patient states that her anxiety and depression are well controlled at this time. Patient states she was seeing Latisha for talk therapy, states that she does not think she needs therapy at this time.  Patient informed she can always return if needed.  Patient states that she took up a yoga class, states that she feels better when exercising/staying active.  Patient states that she started up her garden again, excited about growing okra.  Patient appears to be well, requesting refills    Reports depression today as 1/10, and anxiety as 2/10.  Pt reports taking medications as prescribed and behaving appropriately during interview today.  Denies SI/HI/AVH. Denies side effects of medications.  Pt reports sleeping well and normal appetite.     Denies recreational drug use. Pt has decreased alcohol consumption.  Pt states that she will continue to work on her drinking.  State sthat her goal is to limit drinking socially.  Pt has stopped smoking, denies Vaping, 2 cups of coffee Caffeine.  CAGE 2/4.      Interim Events: 5/3/2023  Pt is A+Ox 4.  Patients mood is "better", affect appears congruent and appropriate. Pts thought process is normal and logical.  Pts speech is normal tone, normal rate, normal pitch, normal volume   Linear and logical, friendly and cooperative, good eye contact, no psychomotor retardation.  Pt is calmly seated in chair during interview.     Patient states that she's been doing better since previous appointment. Patient states that her anxiety and depression are more controlled. " "Patient is more active previously, spending time in her garden every day. Patient went to [place] and spread her  's ashes ocean with family nearby. Patient states that she was crying happy tears.  patient will be visiting family in [place] in may. Patient states that she is excited to see them.     Reports depression today as 3/10, and anxiety as 3/10.  Pt reports taking medications as prescribed and behaving appropriately during interview today.  Denies SI/HI/AVH. Denies side effects of medications.  Pt reports sleeping well and normal appetite.     Denies recreational drug use. Pt has decreased alcohol consumption.  Pt states that she will continue to work on her drinking.  Pt reports 25 drinks per week.  Pt has stopped smoking, denies Vaping, 2 cups of coffee Caffeine.  CAGE 2/4.        Interim Events: 4/3/2023    Pt is A+Ox 4.  Patients mood is "ok", affect appears blunted, sad. Pts thought process is normal and logical.  Pts speech is normal tone, normal rate, normal pitch, normal volume   Linear and logical, friendly and cooperative, poor eye contact, no psychomotor retardation.  Pt is tearful during interview.     Patient states that she has been doing OK since her last appointment. Patient states that her anniversary was two weeks ago ,states that since then her mood has been low. Patient continued to endorse heavy drinking, 5 drinks per night. Patient states that she understands her drinking is becoming an issue, currently is not interested in medications that can cut cravings. Patient encouraged to decrease alcohol consumption as this affects mood. Patient states that she is planning on releasing her late 's ashes Mid-May 2023 in [place].     Reports depression today as 4/10, and anxiety as 5/10.  Pt reports taking medications as prescribed and behaving appropriately during interview today.  Denies SI/HI/AVH. Denies side effects of medications.  Pt reports sleeping well and normal " "appetite.     Denies recreational drug use. Pt reports 35 drinks per week, 0.5ppd tobacco use, denies Vaping, 2 cups of coffee Caffeine.  CAGE 2/4.          Interim events: 03/02/2023  Pt is A+Ox 4.  Patients mood is "better", affect appears appropriate, sad. Pts thought process is normal and logical.  Pts speech is normal tone, normal rate, normal pitch, normal volume   Linear and logical, friendly and cooperative, good eye contact, no psychomotor retardation.  Pt is calmly seated in chair during interview. Pt is casually dressed and well groomed.  Pt is not tearful during interview    Patient states that her anxiety and depression improved since starting Celexa, patient is currently taking Celexa 10mg daily. Pt states that she has cut back on her drinking, currently reports drinking 14 beers a week. Patient states that she has stopped drinking hard liquor. During the last two weeks Pt reports hanging out with friends which has help distract her. Patient also states that she has begun gardening again.    Reports depression today as 4/10, and anxiety as 2/10.    Pt reports taking medications as prescribed and behaving appropriately during interview today.  Denies SI/HI/AVH. Denies side effects of medications.  Pt reports sleeping well and normal appetite.     Denies recreational drug use. Pt reports 14 drinks per week, 0.5ppd tobacco use, denies Vaping, 2 cups of coffee Caffeine.  CAGE 2/4.        Prior visit :  Psych Interview 02/16/2023:   Paula Rodriguez is a 52 y.o. female with past psychiatric history of depression  presented to for initial evaluation and treatment for depression and anxiety.     Pt is A+Ox 4.  Patients mood is "not good", affect appears guarded, sad, anxious. Pts thought process is normal and logical.  Pts speech is normal tone, normal rate, normal pitch, normal volume   Linear and logical, cooperative, poor eye contact, psychomotor retardation.  Pt is tearful during interview.  Pt is casually " dressed and well groomed.       States that she has been taking lexapro for 15 years for depression.  States that she was well controlled until her  unexpectedly  in 2022.  States that she has not been able to grieve his passing.  States that she is from [place] and all of her family lives there.  States that she does have local friends who she can talk to.  States that she has looked into group therapy, but was unsuccessful.  States that she is interested in talk therapy at this time.  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  States that she enjoys her work but works 3 days a week from home.  States that being home is not good for her mental health and she has been going on walks with her dog to get out the house.       Denies prior hx of psychiatric hospitalizations. Denies hx of suicide attempts. Pt denies hx self harm. Pt denies hx hallucinations.  Pt denies hx of eating disorders.   Pt denies hx trauma. Denies physical/sexual abuse. Pt denies symptoms including nightmares, hypervigilance, flashbacks, avoidance behaviors, and disassociation.     Reports depression today as 6/10, and anxiety as 4/10.  Reports sleeping 8 hrs per night, and poor appetite. States that she consumes 5 drinks every night to go to sleep.  State sthat she wants to stop drinks.  States that she has used drinking to cope with her husbands passing.  Lost 20lbs over this last year.    Denies SI/HI/AVH. Denies side effects of medications.     Pt states that there support consists of friends and coworkers.       Denies recreational drug use. Pt reports 35 drinks per week, 0.5ppd tobacco use, denies Vaping, 2 cups of coffee Caffeine.  CAGE 2/4.       Current Medication:  Lexapro 20mg daily  Ambien 10mg nightly        DX:  The patient complained of depressed mood with lethargy, decreased appetite , insomnia, psycho-motor retardation, anhedonia, apathy, worsening self-esteem, guilt, decreased  concentration and ability to make decisions, and thoughts of suicide.      Pt denies hx symptoms/episodes of cathleen.     Admits to symptoms of anxiety including excessive anxiety/worry/fear, more days than not, about numerous issues, difficulty controlling the worry, over thinking, rumination, restlessness, poor concentration, fatigue, and increased irritability. Denies panic attacks at this time.     Past Psychiatric History:   Previous Psychiatric Hospitalizations: NO  Previous Medication Trials: YES:      History of psychotherapy:  NO  Previous Suicide Attempts: NO  History of Violence:  NO  History of physical/sexual abuse: NO  Outpatient psychiatric provider(past): NO     Substance Abuse History:   Tobacco: NO  Alcohol: YES:      Illicit Substances: NO  Detox/Rehab: NO     Neurological History:   Seizures: NO  Head trauma: NO     Family Psychiatric History: Yes -   Father - depression     Social History:  Developmental/Childhood:Achieved all developmental milestones timely  *Education:Bachelor's Degree  Employment Status/Finances:Employed   Relationship Status/Sexual Orientation:    Children: 0  Housing Status: Home    history:  NO  Access to gun: YES:      Mandaen: agnostic  Recreational activities: gardening   Person patient is closest to/confides in: friends     Legal History:   Past Charges/Incarcerations:  No      Review of Systems     Review of Systems   Constitutional:  Negative for weight loss.   HENT:  Negative for tinnitus.    Eyes:  Negative for blurred vision.   Respiratory:  Negative for cough and shortness of breath.    Cardiovascular:  Negative for chest pain.   Gastrointestinal:  Negative for abdominal pain.   Genitourinary:  Negative for dysuria.   Musculoskeletal:  Negative for back pain and neck pain.   Skin:  Negative for rash.   Neurological:  Negative for dizziness, seizures and weakness.   Psychiatric/Behavioral:  Negative for depression, hallucinations, memory loss,  substance abuse and suicidal ideas. The patient is not nervous/anxious and does not have insomnia.        Psychiatric Review Of Systems - Is patient experiencing or having changes in:  sleep: no  appetite: no  weight: no  energy/anergy: no  interest/pleasure/anhedonia: no  somatic symptoms: no  libido: no  anxiety/panic: yes  guilty/hopelessness: no  concentration: no  S.I.B.s/risky behavior: no  Irritability: no  Racing thoughts: no  Impulsive behaviors: no  Paranoia: no  AVH: no      Past Medical, Family and Social History: The patient's past medical, family and social history have been reviewed and updated as appropriate within the electronic medical record - see encounter notes.      Current Medications:   Medication List with Changes/Refills   New Medications    BUSPIRONE (BUSPAR) 5 MG TAB    Take 1 tablet (5 mg total) by mouth 3 (three) times daily.    NALTREXONE (DEPADE) 50 MG TABLET    Take 50 mg by mouth once daily.   Current Medications    HYDROCHLOROTHIAZIDE (HYDRODIURIL) 12.5 MG TAB    Take 1 tablet (12.5 mg total) by mouth once daily.    MECLIZINE (ANTIVERT) 25 MG TABLET    Take 1 tablet (25 mg total) by mouth 3 (three) times daily as needed for Dizziness (or at least one HS for 1 week when vertigo active).    VALSARTAN (DIOVAN) 320 MG TABLET    Take 1 tablet (320 mg total) by mouth once daily.   Changed and/or Refilled Medications    Modified Medication Previous Medication    CITALOPRAM (CELEXA) 20 MG TABLET citalopram (CELEXA) 20 MG tablet       Take 1.5 tablets (30 mg total) by mouth once daily.    Take 1.5 tablets (30 mg total) by mouth once daily.    PROPRANOLOL (INDERAL) 10 MG TABLET propranoloL (INDERAL) 10 MG tablet       Take 1 tablet (10 mg total) by mouth 3 (three) times daily.    Take 1 tablet (10 mg total) by mouth 3 (three) times daily.    TRAZODONE (DESYREL) 50 MG TABLET traZODone (DESYREL) 50 MG tablet       Take 1 tablet (50 mg total) by mouth every evening.    Take 1 tablet (50 mg  "total) by mouth every evening.         Allergies:   Review of patient's allergies indicates:  No Known Allergies      Vitals   Vitals:    11/21/23 0823   BP: (!) 161/106   Pulse: 81   Temp: 98.7 °F (37.1 °C)                  Labs/Imaging/Studies:   No results found for this or any previous visit (from the past 48 hour(s)).   No results found for: "PHENYTOIN", "PHENOBARB", "VALPROATE", "CBMZ"    Compliance: yes    Side effects: None    Risk Parameters:  Patient reports no suicidal ideation  Patient reports no homicidal ideation  Patient reports no self-injurious behavior  Patient reports no violent behavior    Exam (detailed: at least 9 elements; comprehensive: all 15 elements)   Constitutional  Vitals:  Most recent vital signs, dated less than 90 days prior to this appointment, were reviewed.   Vitals:    11/21/23 0823   BP: (!) 161/106   Pulse: 81   Temp: 98.7 °F (37.1 °C)   TempSrc: Oral   SpO2: 99%   Weight: 80.8 kg (178 lb 3.9 oz)   Height: 5' 9" (1.753 m)                General:  unremarkable, age appropriate, casually dressed, neatly groomed     Musculoskeletal  Muscle Strength/Tone:  no spasicity, no rigidity, no cogwheeling, no flaccidity, no paratonia, no dyskinesia, no dystonia, no tremor, no tic, no choreoathetosis, no atrophy   Gait & Station:  non-ataxic     Psychiatric  Speech:  no latency; no press   Mood & Affect:  steady  congruent and appropriate   Thought Process:  normal and logical   Associations:  intact   Thought Content:  normal, no suicidality, no homicidality, delusions, or paranoia   Insight:  intact, has awareness of illness   Judgement: behavior is adequate to circumstances, age appropriate   Orientation:  grossly intact, person, place, situation, time/date   Memory: intact for content of interview, grossly intact, memory >3 objects at five mins   Language: grossly intact, able to name, able to repeat   Attention Span & Concentration:  able to focus, completed tasks   Fund of Knowledge:  " "intact and appropriate to age and level of education, familiar with aspects of current personal life     Assessment and Diagnosis   Status/Progress: Based on the examination today, the patient's problem(s) is/are resolving.  New problems have not been presented today.       General Impression:      ICD-10-CM ICD-9-CM   1. Adjustment disorder with mixed anxiety and depressed mood  F43.23 309.28   2. Insomnia, unspecified type  G47.00 780.52   3. Grief  F43.21 309.0               Intervention/Counseling/Treatment Plan   Mood  Continue Celexa 30mg daily - anxiety and depression    Continue Propanolol 10mg TID PRN anxiety   Pt extensively warned of the risks of developing hypotension while taking this medications.  Symptoms include lightheadedness, dizziness, feeling of passing out.  Pt instructed to stop taking this med if she experiences any of the symptoms.  Continue Trazodone 50mg nightly- sleep  Start Buspar 5mg TID - targeting anxiety   Start Naltrexone 50mg daily - targeting alcohol cravings.      At this time Pt is not a candidate for Benzos due to heavy drinking.     Pt was extensively educated in the importance of making and keeping a talk therapy appointment.       Pt BP is always elevated in clinic.  States that this is due to "Nerves"  In clinic Pt denies HA, SOB, Leg swelling, leg pain, CP, changes in vision     Discussed diagnosis, risks and benefits of proposed treatment above vs alternative treatments vs no treatment, and potential side effects of these treatments, and the inherent unpredictability of individual response to treatment.  The patient expresses understanding and gives informed consent to pursue treatment.  The potential benefits outweigh the potential risks. Patient has no other questions. Risks/adverse effects discussed at this time including but not limited to: GI side effects, sexual dysfunction, activation vs sedation, triggering of suicidal thoughts, and serotonin " syndrome.    Serotonin syndrome   Mental status changes can include anxiety, restlessness, disorientation, and agitated delirium.    Autonomic manifestations can include diaphoresis, tachycardia, hyperthermia, hypertension, vomiting, and diarrhea   Neuromuscular hyperactivity can manifest as tremor, myoclonus, hyperreflexia, rigidity, hyperthermia, seizure, and bilateral Babinski sign.   Pt was informed that if they experience any of these symptoms to go the ED.     Difficulty Sleeping Behavioral Modification:  Implement stimulus control: [place] bedroom for sleep only. Leave bedroom when frustrated from not sleeping. Engage in relaxation before returning. Engage in activities during the day. AVOID >7-8 h time in bed  Avoid clock watching  Avoid thinking/worrying about sleep when trying to fall asleep  Limit caffeinated consumption  Make sure the bedroom is dark, quiet and cool    Safety Plan   Patient voices understanding and agreement with this plan  Provided crisis numbers  Encouraged patient to keep future appointments.  Instructed patient to call or message with questions or concerns  In the event of an emergency, including suicidal ideation, patient was advised to go to the emergency room and/or call 911    Return to Clinic: 6 months, as needed      Psychotherapy:  Target symptoms: depression, anxiety   Why chosen therapy is appropriate versus another modality: relevant to diagnosis, evidence based practice  Outcome monitoring methods: self-report, observation  Therapeutic intervention type: insight oriented psychotherapy, supportive psychotherapy  Topics discussed/themes: illness/death of a loved one, difficulty managing affect in interpersonal relationships, building skills sets for symptom management, symptom recognition  The patient's response to the intervention is accepting. The patient's progress toward treatment goals is good.   Duration of intervention: 15 minutes.    Total face to face time: 30  min  Total time (chart review, patient contact, documentation): 36 min    A diagnostic psychiatric evaluation was performed and responsiveness to treatment was assessed.  The patient demonstrates robust ability/capacity to respond to treatment.    Eron Amaya PA-C    *This note has been prepared using a combination of a dictation device and typing.  It has been checked for errors but some errors may still exist within the note as a result of speech recognition errors and/or typographical errors.

## 2024-05-07 RX ORDER — BUSPIRONE HYDROCHLORIDE 5 MG/1
5 TABLET ORAL 3 TIMES DAILY
Qty: 270 TABLET | Refills: 3 | Status: SHIPPED | OUTPATIENT
Start: 2024-05-07 | End: 2024-05-15 | Stop reason: SDUPTHER

## 2024-05-15 ENCOUNTER — OFFICE VISIT (OUTPATIENT)
Dept: FAMILY MEDICINE | Facility: CLINIC | Age: 54
End: 2024-05-15
Payer: COMMERCIAL

## 2024-05-15 ENCOUNTER — LAB VISIT (OUTPATIENT)
Dept: LAB | Facility: HOSPITAL | Age: 54
End: 2024-05-15
Attending: INTERNAL MEDICINE
Payer: COMMERCIAL

## 2024-05-15 VITALS
BODY MASS INDEX: 26.19 KG/M2 | WEIGHT: 176.81 LBS | SYSTOLIC BLOOD PRESSURE: 134 MMHG | DIASTOLIC BLOOD PRESSURE: 84 MMHG | TEMPERATURE: 98 F | HEART RATE: 70 BPM | OXYGEN SATURATION: 97 % | HEIGHT: 69 IN

## 2024-05-15 DIAGNOSIS — K76.0 FATTY LIVER: ICD-10-CM

## 2024-05-15 DIAGNOSIS — Z00.00 ROUTINE MEDICAL EXAM: Primary | ICD-10-CM

## 2024-05-15 DIAGNOSIS — Z12.12 SCREENING FOR COLORECTAL CANCER: ICD-10-CM

## 2024-05-15 DIAGNOSIS — Z12.31 ENCOUNTER FOR SCREENING MAMMOGRAM FOR BREAST CANCER: ICD-10-CM

## 2024-05-15 DIAGNOSIS — D75.89 MACROCYTOSIS: ICD-10-CM

## 2024-05-15 DIAGNOSIS — F41.9 ANXIETY DISORDER, UNSPECIFIED TYPE: ICD-10-CM

## 2024-05-15 DIAGNOSIS — F39 MOOD DISORDER: ICD-10-CM

## 2024-05-15 DIAGNOSIS — Z00.00 ROUTINE MEDICAL EXAM: ICD-10-CM

## 2024-05-15 DIAGNOSIS — Z12.11 SCREENING FOR COLORECTAL CANCER: ICD-10-CM

## 2024-05-15 DIAGNOSIS — I10 ESSENTIAL HYPERTENSION: ICD-10-CM

## 2024-05-15 DIAGNOSIS — G47.00 INSOMNIA, UNSPECIFIED TYPE: ICD-10-CM

## 2024-05-15 LAB
ALBUMIN SERPL BCP-MCNC: 4.4 G/DL (ref 3.5–5.2)
ALP SERPL-CCNC: 82 U/L (ref 55–135)
ALT SERPL W/O P-5'-P-CCNC: 87 U/L (ref 10–44)
ANION GAP SERPL CALC-SCNC: 10 MMOL/L (ref 8–16)
AST SERPL-CCNC: 49 U/L (ref 10–40)
BASOPHILS # BLD AUTO: 0.05 K/UL (ref 0–0.2)
BASOPHILS NFR BLD: 0.9 % (ref 0–1.9)
BILIRUB SERPL-MCNC: 0.8 MG/DL (ref 0.1–1)
BUN SERPL-MCNC: 15 MG/DL (ref 6–20)
CALCIUM SERPL-MCNC: 10 MG/DL (ref 8.7–10.5)
CHLORIDE SERPL-SCNC: 100 MMOL/L (ref 95–110)
CHOLEST SERPL-MCNC: 212 MG/DL (ref 120–199)
CHOLEST/HDLC SERPL: 2.8 {RATIO} (ref 2–5)
CO2 SERPL-SCNC: 26 MMOL/L (ref 23–29)
CREAT SERPL-MCNC: 0.9 MG/DL (ref 0.5–1.4)
DIFFERENTIAL METHOD BLD: ABNORMAL
EOSINOPHIL # BLD AUTO: 0.1 K/UL (ref 0–0.5)
EOSINOPHIL NFR BLD: 1.3 % (ref 0–8)
ERYTHROCYTE [DISTWIDTH] IN BLOOD BY AUTOMATED COUNT: 11.5 % (ref 11.5–14.5)
EST. GFR  (NO RACE VARIABLE): >60 ML/MIN/1.73 M^2
GLUCOSE SERPL-MCNC: 107 MG/DL (ref 70–110)
HCT VFR BLD AUTO: 42 % (ref 37–48.5)
HDLC SERPL-MCNC: 77 MG/DL (ref 40–75)
HDLC SERPL: 36.3 % (ref 20–50)
HGB BLD-MCNC: 14.1 G/DL (ref 12–16)
IMM GRANULOCYTES # BLD AUTO: 0.02 K/UL (ref 0–0.04)
IMM GRANULOCYTES NFR BLD AUTO: 0.4 % (ref 0–0.5)
LDLC SERPL CALC-MCNC: 115.6 MG/DL (ref 63–159)
LYMPHOCYTES # BLD AUTO: 1.3 K/UL (ref 1–4.8)
LYMPHOCYTES NFR BLD: 23.3 % (ref 18–48)
MCH RBC QN AUTO: 33 PG (ref 27–31)
MCHC RBC AUTO-ENTMCNC: 33.6 G/DL (ref 32–36)
MCV RBC AUTO: 98 FL (ref 82–98)
MONOCYTES # BLD AUTO: 0.6 K/UL (ref 0.3–1)
MONOCYTES NFR BLD: 10.9 % (ref 4–15)
NEUTROPHILS # BLD AUTO: 3.4 K/UL (ref 1.8–7.7)
NEUTROPHILS NFR BLD: 63.2 % (ref 38–73)
NONHDLC SERPL-MCNC: 135 MG/DL
NRBC BLD-RTO: 0 /100 WBC
PLATELET # BLD AUTO: 297 K/UL (ref 150–450)
PMV BLD AUTO: 10.4 FL (ref 9.2–12.9)
POTASSIUM SERPL-SCNC: 4.2 MMOL/L (ref 3.5–5.1)
PROT SERPL-MCNC: 7.5 G/DL (ref 6–8.4)
RBC # BLD AUTO: 4.27 M/UL (ref 4–5.4)
SODIUM SERPL-SCNC: 136 MMOL/L (ref 136–145)
TRIGL SERPL-MCNC: 97 MG/DL (ref 30–150)
TSH SERPL DL<=0.005 MIU/L-ACNC: 1.69 UIU/ML (ref 0.4–4)
WBC # BLD AUTO: 5.41 K/UL (ref 3.9–12.7)

## 2024-05-15 PROCEDURE — 36415 COLL VENOUS BLD VENIPUNCTURE: CPT | Mod: PO | Performed by: INTERNAL MEDICINE

## 2024-05-15 PROCEDURE — 3079F DIAST BP 80-89 MM HG: CPT | Mod: CPTII,S$GLB,, | Performed by: INTERNAL MEDICINE

## 2024-05-15 PROCEDURE — 1159F MED LIST DOCD IN RCRD: CPT | Mod: CPTII,S$GLB,, | Performed by: INTERNAL MEDICINE

## 2024-05-15 PROCEDURE — 3008F BODY MASS INDEX DOCD: CPT | Mod: CPTII,S$GLB,, | Performed by: INTERNAL MEDICINE

## 2024-05-15 PROCEDURE — 80053 COMPREHEN METABOLIC PANEL: CPT | Performed by: INTERNAL MEDICINE

## 2024-05-15 PROCEDURE — 3075F SYST BP GE 130 - 139MM HG: CPT | Mod: CPTII,S$GLB,, | Performed by: INTERNAL MEDICINE

## 2024-05-15 PROCEDURE — 80061 LIPID PANEL: CPT | Performed by: INTERNAL MEDICINE

## 2024-05-15 PROCEDURE — 4010F ACE/ARB THERAPY RXD/TAKEN: CPT | Mod: CPTII,S$GLB,, | Performed by: INTERNAL MEDICINE

## 2024-05-15 PROCEDURE — 99396 PREV VISIT EST AGE 40-64: CPT | Mod: S$GLB,,, | Performed by: INTERNAL MEDICINE

## 2024-05-15 PROCEDURE — 85025 COMPLETE CBC W/AUTO DIFF WBC: CPT | Performed by: INTERNAL MEDICINE

## 2024-05-15 PROCEDURE — 99999 PR PBB SHADOW E&M-EST. PATIENT-LVL III: CPT | Mod: PBBFAC,,, | Performed by: INTERNAL MEDICINE

## 2024-05-15 PROCEDURE — 84443 ASSAY THYROID STIM HORMONE: CPT | Performed by: INTERNAL MEDICINE

## 2024-05-15 RX ORDER — CITALOPRAM 40 MG/1
40 TABLET, FILM COATED ORAL DAILY
Qty: 90 TABLET | Refills: 3 | Status: SHIPPED | OUTPATIENT
Start: 2024-05-15

## 2024-05-15 RX ORDER — VALSARTAN 320 MG/1
320 TABLET ORAL DAILY
Qty: 90 TABLET | Refills: 12 | Status: SHIPPED | OUTPATIENT
Start: 2024-05-15 | End: 2027-07-29

## 2024-05-15 RX ORDER — BUSPIRONE HYDROCHLORIDE 5 MG/1
5 TABLET ORAL 3 TIMES DAILY
Qty: 270 TABLET | Refills: 3 | Status: SHIPPED | OUTPATIENT
Start: 2024-05-15 | End: 2025-05-15

## 2024-05-15 RX ORDER — HYDROCHLOROTHIAZIDE 25 MG/1
25 TABLET ORAL DAILY
Qty: 90 TABLET | Refills: 3 | Status: SHIPPED | OUTPATIENT
Start: 2024-05-15 | End: 2025-05-15

## 2024-05-15 NOTE — PROGRESS NOTES
Chief complaint physical    53-year-old white female still active .  She was working for home due to the pandemic.  Her ' retired and then committed suicide.   At the end of the day it is hard for her to wind down.  She does have a couple of drinks to help her relax.  She then has trouble falling asleep but not related to the alcohol.  She tolerated Ambien in the past without any problems.  We discussed other potential side effects of Ambien.  She has had no withdrawal the next day.  She will use it with caution if drinking but never had any issues in the past.  She does feel anxieties fairly under control and not interfering with function on the Lexapro 20 and now appears she is on Celexa which was increase to 30 mg but would like to go to 40 mg..  .  Primary issue with Ambien was limited covered by insurance.  She did see psychiatrist looks like she now on trazodone.     Some stress this week and blood pressure is up but usually runs better at home.  Whenever stressed it runs higher.  She is working and that is somewhat stressful but also planning to go to [place] this weekend and that is stressful.  She is now seeing Psychiatry and takes the Inderal only as needed.  She would like to increase the Celexa from 30 mg to 40 mg due to overall incomplete treatment of stress and depression and I would agree with that.  She is on her BuSpar which I advised her could also be increased if so needed.    Labs reviewed from April and unremarkable but somehow CMP not done.  No issues with CMP in the past other than one time and ALT was up.  We will make sure we get that CMP with next blood work.    She did actually have a normal colonoscopy with Dr. Bansal 2021.  She was told come back in 10 years    Mammo 4/23, order in and patient plans to schedule when she gets back in June.  Previously [facility] but would like to switch over to [facility].     she is walking her dog every day.      Blood pressure running  well at home but with it rising often during stress, we discussed an overall increase in her hydrochlorothiazide from 12.5-25 mg.  In the future we could also possibly try to get her to take her propranolol on a more regular rather than p.r.n. basis and that could help as well.      .  MCV was slightly up.  B12 ok,  may represent the alcohol intake.    CONST: weight stable. EYES: no vision change. ENT: no sore throat. CV: no chest pain w/ exertion. RESP: no shortness of breath. GI: no nausea, vomiting, diarrhea. No dysphagia. : no urinary issues. MUSCULOSKELETAL: no new myalgias or arthralgias. SKIN: no new changes. NEURO: no focal deficits. PSYCH: no new issues. ENDOCRINE: no polyuria. HEME: no lymph nodes. ALLERGY: no general pruritis.     PAST MEDICAL HISTORY:    1. Anxiety/insomnia -Lexapro, occasional use of Ambien  2.  Hypertension formally diagnosed   3. Mammo per Gyn at [facility].   4.  Nail dermatophytosis    5.  Isolated ALT elevation resolved - fatty liver    6. Normal colonoscopy 2021, 10 years, Dr. Bansal                                                                                                                      PAST SURGICAL HISTORY:  None.                                                                                                                             SOCIAL HISTORY:  . Former smoker.  Quit in  and smoked for 10 years        prior.  She drinks alcohol on the weekends.                                                                                                               FAMILY HISTORY:  Mom  with no problems.  Father  last year at 76    of lung cancer and was a nonsmoker but was a book keeper in [place].    One sister in 40s in good health.        Gen: no distress  EYES: conjunctiva clear, non-icteric, PERRL  ENT: nose clear, nasal mucosa normal, oropharynx clear and moist, teeth good  NECK:supple, thyroid non-palpable  RESP: effort is good,  lungs clear  CV: heart RRR w/o murmur, gallops or rubs; no carotid bruits, no edema  GI: abdomen soft, non-distended, non-tender, no hepatosplenomegaly  MS: gait normal, no clubbing or cyanosis of the digits  SKIN: no rashes, warm to touch       Diagnoses and all orders for this visit:    Routine medical exam, wants to switch mammogram over to [facility] and will do so.  Up-to-date on colon screening  -     CBC Auto Differential; Future  -     Comprehensive Metabolic Panel; Future  -     TSH; Future  -     Lipid Panel; Future    Encounter for screening mammogram for breast cancer  -     Mammo Digital Screening Bilat; Future    Screening for colorectal cancer    Essential hypertension, likely incomplete control, increase hydrochlorothiazide and monitor  -     hydroCHLOROthiazide (HYDRODIURIL) 25 MG tablet; Take 1 tablet (25 mg total) by mouth once daily.    Anxiety disorder, unspecified type, seeing Psychiatry, incomplete control and she requests Celexa increase to 40.  Continue BuSpar and trazodone    Mood disorder    Insomnia, unspecified type, trazodone as above    Fatty liver, ALT elevated once    Macrocytosis, reassess    Other orders  -     busPIRone (BUSPAR) 5 MG Tab; Take 1 tablet (5 mg total) by mouth 3 (three) times daily.  -     citalopram (CELEXA) 40 MG tablet; Take 1 tablet (40 mg total) by mouth once daily.  -     valsartan (DIOVAN) 320 MG tablet; Take 1 tablet (320 mg total) by mouth once daily.

## 2024-06-12 ENCOUNTER — HOSPITAL ENCOUNTER (OUTPATIENT)
Dept: RADIOLOGY | Facility: HOSPITAL | Age: 54
Discharge: HOME | End: 2024-06-12
Attending: INTERNAL MEDICINE
Payer: COMMERCIAL

## 2024-06-12 DIAGNOSIS — Z12.31 ENCOUNTER FOR SCREENING MAMMOGRAM FOR BREAST CANCER: ICD-10-CM

## 2024-06-12 PROCEDURE — 77063 BREAST TOMOSYNTHESIS BI: CPT | Mod: TC,PO

## 2024-06-12 PROCEDURE — 77067 SCR MAMMO BI INCL CAD: CPT | Mod: 26,,, | Performed by: RADIOLOGY

## 2024-06-12 PROCEDURE — 77067 SCR MAMMO BI INCL CAD: CPT | Mod: TC,PO

## 2024-06-12 PROCEDURE — 77063 BREAST TOMOSYNTHESIS BI: CPT | Mod: 26,,, | Performed by: RADIOLOGY

## 2024-07-26 NOTE — TELEPHONE ENCOUNTER
Left message to see if Dr. Matos is PCP , needs office visit and B/P check  
Temples.../Clavicles.../Shoulders...

## 2025-03-26 ENCOUNTER — PATIENT MESSAGE (OUTPATIENT)
Dept: PSYCHIATRY | Facility: CLINIC | Age: 55
End: 2025-03-26
Payer: COMMERCIAL

## 2025-03-26 ENCOUNTER — OFFICE VISIT (OUTPATIENT)
Dept: PSYCHIATRY | Facility: CLINIC | Age: 55
End: 2025-03-26
Payer: COMMERCIAL

## 2025-03-26 ENCOUNTER — LAB VISIT (OUTPATIENT)
Dept: LAB | Facility: HOSPITAL | Age: 55
End: 2025-03-26
Payer: COMMERCIAL

## 2025-03-26 VITALS
BODY MASS INDEX: 25.96 KG/M2 | HEIGHT: 69 IN | WEIGHT: 175.25 LBS | SYSTOLIC BLOOD PRESSURE: 165 MMHG | HEART RATE: 83 BPM | DIASTOLIC BLOOD PRESSURE: 125 MMHG

## 2025-03-26 DIAGNOSIS — Z79.899 ENCOUNTER FOR LONG-TERM (CURRENT) USE OF HIGH-RISK MEDICATION: ICD-10-CM

## 2025-03-26 DIAGNOSIS — F41.1 GAD (GENERALIZED ANXIETY DISORDER): Primary | ICD-10-CM

## 2025-03-26 DIAGNOSIS — G47.00 INSOMNIA, UNSPECIFIED TYPE: ICD-10-CM

## 2025-03-26 LAB
ALBUMIN SERPL BCP-MCNC: 4.8 G/DL (ref 3.5–5.2)
ALP SERPL-CCNC: 87 UNIT/L (ref 40–150)
ALT SERPL W/O P-5'-P-CCNC: 45 UNIT/L (ref 10–44)
ANION GAP (OHS): 14 MMOL/L (ref 8–16)
AST SERPL-CCNC: 42 UNIT/L (ref 11–45)
BILIRUB SERPL-MCNC: 1.4 MG/DL (ref 0.1–1)
BUN SERPL-MCNC: 13 MG/DL (ref 6–20)
CALCIUM SERPL-MCNC: 9.7 MG/DL (ref 8.7–10.5)
CHLORIDE SERPL-SCNC: 97 MMOL/L (ref 95–110)
CO2 SERPL-SCNC: 25 MMOL/L (ref 23–29)
CREAT SERPL-MCNC: 1 MG/DL (ref 0.5–1.4)
GFR SERPLBLD CREATININE-BSD FMLA CKD-EPI: >60 ML/MIN/1.73/M2
GLUCOSE SERPL-MCNC: 105 MG/DL (ref 70–110)
POTASSIUM SERPL-SCNC: 3.9 MMOL/L (ref 3.5–5.1)
PROT SERPL-MCNC: 8.5 GM/DL (ref 6–8.4)
SODIUM SERPL-SCNC: 136 MMOL/L (ref 136–145)

## 2025-03-26 PROCEDURE — G2211 COMPLEX E/M VISIT ADD ON: HCPCS | Mod: S$GLB,,,

## 2025-03-26 PROCEDURE — 1160F RVW MEDS BY RX/DR IN RCRD: CPT | Mod: CPTII,S$GLB,,

## 2025-03-26 PROCEDURE — 84295 ASSAY OF SERUM SODIUM: CPT

## 2025-03-26 PROCEDURE — 99214 OFFICE O/P EST MOD 30 MIN: CPT | Mod: S$GLB,,,

## 2025-03-26 PROCEDURE — 1159F MED LIST DOCD IN RCRD: CPT | Mod: CPTII,S$GLB,,

## 2025-03-26 PROCEDURE — 3008F BODY MASS INDEX DOCD: CPT | Mod: CPTII,S$GLB,,

## 2025-03-26 PROCEDURE — 3080F DIAST BP >= 90 MM HG: CPT | Mod: CPTII,S$GLB,,

## 2025-03-26 PROCEDURE — 99999 PR PBB SHADOW E&M-EST. PATIENT-LVL III: CPT | Mod: PBBFAC,,,

## 2025-03-26 PROCEDURE — 36415 COLL VENOUS BLD VENIPUNCTURE: CPT

## 2025-03-26 PROCEDURE — 4010F ACE/ARB THERAPY RXD/TAKEN: CPT | Mod: CPTII,S$GLB,,

## 2025-03-26 PROCEDURE — 3077F SYST BP >= 140 MM HG: CPT | Mod: CPTII,S$GLB,,

## 2025-03-26 RX ORDER — CLONAZEPAM 0.25 MG/1
0.25 TABLET, ORALLY DISINTEGRATING ORAL DAILY
Qty: 30 TABLET | Refills: 0 | Status: SHIPPED | OUTPATIENT
Start: 2025-03-26 | End: 2025-04-25

## 2025-03-26 RX ORDER — FLUOXETINE HYDROCHLORIDE 20 MG/1
20 CAPSULE ORAL DAILY
Qty: 30 CAPSULE | Refills: 1 | Status: SHIPPED | OUTPATIENT
Start: 2025-03-26 | End: 2025-05-25

## 2025-03-26 NOTE — PROGRESS NOTES
"Outpatient Psychiatry Follow-Up Visit   3/26/2025    Clinical Status of Patient:  Outpatient (Ambulatory)    Chief Complaint:  Paula Rodriguez is a 54 y.o. female who presents today for follow-up of depression and anxiety.  Met with patient.      Interval History and Content of Current Session 03/26/2025:  Pt is A+Ox 4.  Patients mood is "not good", affect appears guarded, sad, anxious. Pts thought process is normal and logical.  Pts speech is normal tone, normal rate, normal pitch, normal volume   Linear and logical, friendly and cooperative, normal eye contact, no psychomotor retardation.  Pt is calmly seated in chair during interview.     Patient states that she has not been doing well since our previous appointment. States that she has been taking celexa 40 MG daily, buspar 15MG BID, Propranolol 10MG TID.  states she stopped naltrexone, after taking it for two months stopped due to being able to cut back on alcohol consumption on her own. Patient states that March makes the anniversary for her 's death. Patient states that she is struggling with anxiety and isolation. Patient works mainly from home, will go into the office occasionally. Patient states that when she goes into the office there is only one other person there. Patient has been reaching out to friends for support. Endorses panic attacks.     Pt reports taking medications as prescribed and behaving appropriately during interview today.  Denies SI/HI/AVH. Denies side effects of medications.  Pt reports sleeping ok and normal appetite.   Denies recreational drug use. Pt has decreased alcohol consumption.  Pt states that she will continue to work on her drinking.  State sthat her goal is to limit drinking socially. Drinks 2 beers a day. CAGE 2/4.    Pt has stopped smoking, denies Vaping, 2 cups of coffee Caffeine.      Standardized Screenings tools:   PHQ9: 6  LANIE- 7: 14      Interim Events: 11/21/2023  Pt is A+Ox 4.  Patients mood is "good", " "affect appears congruent and appropriate. Pts thought process is normal and logical.  Pts speech is normal tone, normal rate, normal pitch, normal volume   Linear and logical, friendly and cooperative, normal eye contact, no psychomotor retardation.  Pt is calmly seated in chair during interview.     Patient states that she has been doing well since our last appointment. Take all medications as prescribed. Endorses an increase in anxiety due to the holidays and a cruise planned in early December. Patient states that she intends to spend the holidays with local friends. Patient came back from [place] a couple weeks ago, states that the trip went well and she is excited to go back. Patient endorses interest in naltrexone, we discussed this medication in the past patient states that she is ready to try it. States that she will start medication after the holidays. Currently patient drinks 5 beers a day. States that her mood is better when she does not drink. Patients requesting refills.    Pt reports taking medications as prescribed and behaving appropriately during interview today.  Denies SI/HI/AVH. Denies side effects of medications.  Pt reports sleeping ok and normal appetite.   Denies recreational drug use. Pt has decreased alcohol consumption.  Pt states that she will continue to work on her drinking.  State sthat her goal is to limit drinking socially. Drinks 5 beers a day. CAGE 2/4.    Pt has stopped smoking, denies Vaping, 2 cups of coffee Caffeine.        Interim Events: 6/14/2023  Pt is A+Ox 4.  Patients mood is "good", affect appears congruent and appropriate. Pts thought process is normal and logical.  Pts speech is normal tone, normal rate, normal pitch, normal volume   Linear and logical, friendly and cooperative, normal eye contact, no psychomotor retardation.  Pt is calmly seated in chair during interview.     Patient states that she has been doing well since our last appointment. Patient states that " "she got back from [place] one week ago and had a fantastic time visiting family. Patient states that her anxiety and depression are well controlled at this time. Patient states she was seeing Latisha for talk therapy, states that she does not think she needs therapy at this time.  Patient informed she can always return if needed.  Patient states that she took up a yoga class, states that she feels better when exercising/staying active.  Patient states that she started up her garden again, excited about growing okra.  Patient appears to be well, requesting refills    Reports depression today as 1/10, and anxiety as 2/10.  Pt reports taking medications as prescribed and behaving appropriately during interview today.  Denies SI/HI/AVH. Denies side effects of medications.  Pt reports sleeping well and normal appetite.     Denies recreational drug use. Pt has decreased alcohol consumption.  Pt states that she will continue to work on her drinking.  State sthat her goal is to limit drinking socially.  Pt has stopped smoking, denies Vaping, 2 cups of coffee Caffeine.  CAGE 2/4.      Interim Events: 5/3/2023  Pt is A+Ox 4.  Patients mood is "better", affect appears congruent and appropriate. Pts thought process is normal and logical.  Pts speech is normal tone, normal rate, normal pitch, normal volume   Linear and logical, friendly and cooperative, good eye contact, no psychomotor retardation.  Pt is calmly seated in chair during interview.     Patient states that she's been doing better since previous appointment. Patient states that her anxiety and depression are more controlled. Patient is more active previously, spending time in her garden every day. Patient went to [place] and spread her  's ashes ocean with family nearby. Patient states that she was crying happy tears.  patient will be visiting family in [place] in may. Patient states that she is excited to see them.     Reports depression today as 3/10, and " "anxiety as 3/10.  Pt reports taking medications as prescribed and behaving appropriately during interview today.  Denies SI/HI/AVH. Denies side effects of medications.  Pt reports sleeping well and normal appetite.     Denies recreational drug use. Pt has decreased alcohol consumption.  Pt states that she will continue to work on her drinking.  Pt reports 25 drinks per week.  Pt has stopped smoking, denies Vaping, 2 cups of coffee Caffeine.  CAGE 2/4.        Interim Events: 4/3/2023    Pt is A+Ox 4.  Patients mood is "ok", affect appears blunted, sad. Pts thought process is normal and logical.  Pts speech is normal tone, normal rate, normal pitch, normal volume   Linear and logical, friendly and cooperative, poor eye contact, no psychomotor retardation.  Pt is tearful during interview.     Patient states that she has been doing OK since her last appointment. Patient states that her anniversary was two weeks ago ,states that since then her mood has been low. Patient continued to endorse heavy drinking, 5 drinks per night. Patient states that she understands her drinking is becoming an issue, currently is not interested in medications that can cut cravings. Patient encouraged to decrease alcohol consumption as this affects mood. Patient states that she is planning on releasing her late 's ashes Mid-May 2023 in [place].     Reports depression today as 4/10, and anxiety as 5/10.  Pt reports taking medications as prescribed and behaving appropriately during interview today.  Denies SI/HI/AVH. Denies side effects of medications.  Pt reports sleeping well and normal appetite.     Denies recreational drug use. Pt reports 35 drinks per week, 0.5ppd tobacco use, denies Vaping, 2 cups of coffee Caffeine.  CAGE 2/4.          Interim events: 03/02/2023  Pt is A+Ox 4.  Patients mood is "better", affect appears appropriate, sad. Pts thought process is normal and logical.  Pts speech is normal tone, normal rate, normal " "pitch, normal volume   Linear and logical, friendly and cooperative, good eye contact, no psychomotor retardation.  Pt is calmly seated in chair during interview. Pt is casually dressed and well groomed.  Pt is not tearful during interview    Patient states that her anxiety and depression improved since starting Celexa, patient is currently taking Celexa 10mg daily. Pt states that she has cut back on her drinking, currently reports drinking 14 beers a week. Patient states that she has stopped drinking hard liquor. During the last two weeks Pt reports hanging out with friends which has help distract her. Patient also states that she has begun gardening again.    Reports depression today as 4/10, and anxiety as 2/10.    Pt reports taking medications as prescribed and behaving appropriately during interview today.  Denies SI/HI/AVH. Denies side effects of medications.  Pt reports sleeping well and normal appetite.     Denies recreational drug use. Pt reports 14 drinks per week, 0.5ppd tobacco use, denies Vaping, 2 cups of coffee Caffeine.  CAGE 2/.        Prior visit :  Psych Interview 2023:   Paula Rodriguez is a 52 y.o. female with past psychiatric history of depression  presented to for initial evaluation and treatment for depression and anxiety.     Pt is A+Ox 4.  Patients mood is "not good", affect appears guarded, sad, anxious. Pts thought process is normal and logical.  Pts speech is normal tone, normal rate, normal pitch, normal volume   Linear and logical, cooperative, poor eye contact, psychomotor retardation.  Pt is tearful during interview.  Pt is casually dressed and well groomed.       States that she has been taking lexapro for 15 years for depression.  States that she was well controlled until her  unexpectedly  in 2022.  States that she has not been able to grieve his passing.  States that she is from [place] and all of her family lives there.  States that she does have local " friends who she can talk to.  States that she has looked into group therapy, but was unsuccessful.  States that she is interested in talk therapy at this time.  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  States that she enjoys her work but works 3 days a week from home.  States that being home is not good for her mental health and she has been going on walks with her dog to get out the house.       Denies prior hx of psychiatric hospitalizations. Denies hx of suicide attempts. Pt denies hx self harm. Pt denies hx hallucinations.  Pt denies hx of eating disorders.   Pt denies hx trauma. Denies physical/sexual abuse. Pt denies symptoms including nightmares, hypervigilance, flashbacks, avoidance behaviors, and disassociation.     Reports depression today as 6/10, and anxiety as 4/10.  Reports sleeping 8 hrs per night, and poor appetite. States that she consumes 5 drinks every night to go to sleep.  State sthat she wants to stop drinks.  States that she has used drinking to cope with her husbands passing.  Lost 20lbs over this last year.    Denies SI/HI/AVH. Denies side effects of medications.     Pt states that there support consists of friends and coworkers.       Denies recreational drug use. Pt reports 35 drinks per week, 0.5ppd tobacco use, denies Vaping, 2 cups of coffee Caffeine.  CAGE 2/4.       Current Medication:  Lexapro 20mg daily  Ambien 10mg nightly        DX:  The patient complained of depressed mood with lethargy, decreased appetite , insomnia, psycho-motor retardation, anhedonia, apathy, worsening self-esteem, guilt, decreased concentration and ability to make decisions, and thoughts of suicide.      Pt denies hx symptoms/episodes of cathleen.     Admits to symptoms of anxiety including excessive anxiety/worry/fear, more days than not, about numerous issues, difficulty controlling the worry, over thinking, rumination, restlessness, poor concentration, fatigue, and increased  irritability. Denies panic attacks at this time.     Past Psychiatric History:   Previous Psychiatric Hospitalizations: NO  Previous Medication Trials: YES:      History of psychotherapy:  NO  Previous Suicide Attempts: NO  History of Violence:  NO  History of physical/sexual abuse: NO  Outpatient psychiatric provider(past): NO     Substance Abuse History:   Tobacco: NO  Alcohol: YES:      Illicit Substances: NO  Detox/Rehab: NO     Neurological History:   Seizures: NO  Head trauma: NO     Family Psychiatric History: Yes -   Father - depression     Social History:  Developmental/Childhood:Achieved all developmental milestones timely  *Education:Bachelor's Degree  Employment Status/Finances:Employed   Relationship Status/Sexual Orientation:    Children: 0  Housing Status: Home    history:  NO  Access to gun: YES:      Hoahaoism: agnostic  Recreational activities: gardening   Person patient is closest to/confides in: friends     Legal History:   Past Charges/Incarcerations:  No      Review of Systems     Review of Systems   Constitutional:  Negative for weight loss.   HENT:  Negative for tinnitus.    Eyes:  Negative for blurred vision.   Respiratory:  Negative for cough and shortness of breath.    Cardiovascular:  Negative for chest pain.   Gastrointestinal:  Negative for abdominal pain.   Genitourinary:  Negative for dysuria.   Musculoskeletal:  Negative for back pain and neck pain.   Skin:  Negative for rash.   Neurological:  Negative for dizziness, seizures and weakness.   Psychiatric/Behavioral:  Positive for depression. Negative for hallucinations, memory loss, substance abuse and suicidal ideas. The patient is nervous/anxious. The patient does not have insomnia.        Past Medical, Family and Social History: The patient's past medical, family and social history have been reviewed and updated as appropriate within the electronic medical record - see encounter notes.      Current Medications:  "  Medication List with Changes/Refills   New Medications    CLONAZEPAM (KLONOPIN) 0.25 MG TBDL    Take 1 tablet (0.25 mg total) by mouth once daily.    FLUOXETINE 20 MG CAPSULE    Take 1 capsule (20 mg total) by mouth once daily.   Current Medications    HYDROCHLOROTHIAZIDE (HYDRODIURIL) 25 MG TABLET    Take 1 tablet (25 mg total) by mouth once daily.    TRAZODONE (DESYREL) 50 MG TABLET    Take 1 tablet (50 mg total) by mouth every evening.    VALSARTAN (DIOVAN) 320 MG TABLET    Take 1 tablet (320 mg total) by mouth once daily.   Discontinued Medications    BUSPIRONE (BUSPAR) 5 MG TAB    Take 1 tablet (5 mg total) by mouth 3 (three) times daily.    CITALOPRAM (CELEXA) 40 MG TABLET    Take 1 tablet (40 mg total) by mouth once daily.    PROPRANOLOL (INDERAL) 10 MG TABLET    Take 1 tablet (10 mg total) by mouth 3 (three) times daily.         Allergies:   Review of patient's allergies indicates:  No Known Allergies      Vitals   Vitals:    03/26/25 0908   BP: (!) 165/125   Pulse: 83                    Labs/Imaging/Studies:   Recent Results (from the past 48 hours)   Comprehensive Metabolic Panel    Collection Time: 03/26/25 10:16 AM   Result Value Ref Range    Sodium 136 136 - 145 mmol/L    Potassium 3.9 3.5 - 5.1 mmol/L    Chloride 97 95 - 110 mmol/L    CO2 25 23 - 29 mmol/L    Glucose 105 70 - 110 mg/dL    BUN 13 6 - 20 mg/dL    Creatinine 1.0 0.5 - 1.4 mg/dL    Calcium 9.7 8.7 - 10.5 mg/dL    Protein Total 8.5 (H) 6.0 - 8.4 gm/dL    Albumin 4.8 3.5 - 5.2 g/dL    Bilirubin Total 1.4 (H) 0.1 - 1.0 mg/dL    ALP 87 40 - 150 unit/L    AST 42 11 - 45 unit/L    ALT 45 (H) 10 - 44 unit/L    Anion Gap 14 8 - 16 mmol/L    eGFR >60 >60 mL/min/1.73/m2      No results found for: "PHENYTOIN", "PHENOBARB", "VALPROATE", "CBMZ"    Compliance: yes    Side effects: None    Risk Parameters:  Patient reports no suicidal ideation  Patient reports no homicidal ideation  Patient reports no self-injurious behavior  Patient reports no violent " "behavior    Exam (detailed: at least 9 elements; comprehensive: all 15 elements)   Constitutional  Vitals:  Most recent vital signs, dated less than 90 days prior to this appointment, were reviewed.   Vitals:    03/26/25 0908   BP: (!) 165/125   Pulse: 83   Weight: 79.5 kg (175 lb 4.3 oz)   Height: 5' 9" (1.753 m)        General:  unremarkable, age appropriate, casually dressed, neatly groomed     Musculoskeletal  Muscle Strength/Tone:  no spasicity, no rigidity, no cogwheeling, no flaccidity, no paratonia, no dyskinesia, no dystonia, no tremor, no tic, no choreoathetosis, no atrophy   Gait & Station:  non-ataxic     Psychiatric  Speech:  no latency; no press   Mood & Affect:  steady  congruent and appropriate   Thought Process:  normal and logical   Associations:  intact   Thought Content:  normal, no suicidality, no homicidality, delusions, or paranoia   Insight:  intact, has awareness of illness   Judgement: behavior is adequate to circumstances, age appropriate   Orientation:  grossly intact, person, place, situation, time/date   Memory: intact for content of interview, grossly intact, memory >3 objects at five mins   Language: grossly intact, able to name, able to repeat   Attention Span & Concentration:  able to focus, completed tasks   Fund of Knowledge:  intact and appropriate to age and level of education, familiar with aspects of current personal life     Assessment and Diagnosis   Status/Progress: Based on the examination today, the patient's problem(s) is/are resolving.  New problems have not been presented today.       General Impression:      ICD-10-CM ICD-9-CM   1. LANIE (generalized anxiety disorder)  F41.1 300.02   2. Encounter for long-term (current) use of high-risk medication  Z79.899 V58.69   3. Insomnia, unspecified type  G47.00 780.52         Intervention/Counseling/Treatment Plan   Mood  Cross taper from Celexa to Cymbalta    -Week 1: Decrease celexa 20mg daily and start Prozac 20mg daily   - " "Week 2-till: Stop Celexa and continue Prozac 20mg daily     Stop Propanolol and buspar  Continue Trazodone 50mg nightly- targeting sleep.   Start klonopin 0.25mg daily PRN panic.   -  Reviewed, Pt is in compliance  - 0 refills given  Will hold Naltrexone at this time. Pt has cut back on alcohol on her own.     Pt was extensively educated in the importance of making and keeping a talk therapy appointment.       Pt BP is always elevated in clinic.  States that this is due to "Nerves"  In clinic Pt denies HA, SOB, Leg swelling, leg pain, CP, changes in vision     Discussed diagnosis, risks and benefits of proposed treatment above vs alternative treatments vs no treatment, and potential side effects of these treatments, and the inherent unpredictability of individual response to treatment.  The patient expresses understanding and gives informed consent to pursue treatment.  The potential benefits outweigh the potential risks. Patient has no other questions. Risks/adverse effects discussed at this time including but not limited to: GI side effects, sexual dysfunction, activation vs sedation, triggering of suicidal thoughts, and serotonin syndrome.   Discussed with patient, the potential adverse effects of Benzodiazepines, including, but not limited to, drowsiness, dizziness, risk of falls, and abuse potential. Counseled patient on avoiding alcohol, while using this medication, due to the risk of respiratory depression. Patient instructed not to operate any heavy machinery or drive a vehicle while on this medication. Informed patient of the risks of continuous benzodiazepine use, including tolerance, dependence and withdrawals that may be life threatening, upon abrupt cessation. Also advised patient not to take benzodiazepines with opiates and/or other sedatives. The patient expresses an understanding of the above as well as the inherent unpredictability of said treatment.  The patient agrees that, currently, the " benefits outweigh the risks, and would like to pursue said treatment at this time.      Serotonin syndrome   Mental status changes can include anxiety, restlessness, disorientation, and agitated delirium.    Autonomic manifestations can include diaphoresis, tachycardia, hyperthermia, hypertension, vomiting, and diarrhea   Neuromuscular hyperactivity can manifest as tremor, myoclonus, hyperreflexia, rigidity, hyperthermia, seizure, and bilateral Babinski sign.   Pt was informed that if they experience any of these symptoms to go the ED.     Difficulty Sleeping Behavioral Modification:  Implement stimulus control: [place] bedroom for sleep only. Leave bedroom when frustrated from not sleeping. Engage in relaxation before returning. Engage in activities during the day. AVOID >7-8 h time in bed  Avoid clock watching  Avoid thinking/worrying about sleep when trying to fall asleep  Limit caffeinated consumption  Make sure the bedroom is dark, quiet and cool    Safety Plan   Patient voices understanding and agreement with this plan  Provided crisis numbers  Encouraged patient to keep future appointments.  Instructed patient to call or message with questions or concerns  In the event of an emergency, including suicidal ideation, patient was advised to go to the emergency room and/or call 911    Return to Clinic: 1 month      Psychotherapy:  Target symptoms: depression, anxiety   Why chosen therapy is appropriate versus another modality: relevant to diagnosis, evidence based practice  Outcome monitoring methods: self-report, observation  Therapeutic intervention type: insight oriented psychotherapy, supportive psychotherapy  Topics discussed/themes: illness/death of a loved one, difficulty managing affect in interpersonal relationships, building skills sets for symptom management, symptom recognition  The patient's response to the intervention is accepting. The patient's progress toward treatment goals is good.   Duration of  intervention: 15 minutes.    Total face to face time: 30 min  Total time (chart review, patient contact, documentation): 36 min    A diagnostic psychiatric evaluation was performed and responsiveness to treatment was assessed.  The patient demonstrates robust ability/capacity to respond to treatment.    Eron Amaya PA-C    *This note has been prepared using a combination of a dictation device and typing.  It has been checked for errors but some errors may still exist within the note as a result of speech recognition errors and/or typographical errors.

## 2025-04-24 ENCOUNTER — OFFICE VISIT (OUTPATIENT)
Dept: PSYCHIATRY | Facility: CLINIC | Age: 55
End: 2025-04-24
Payer: COMMERCIAL

## 2025-04-24 VITALS
HEART RATE: 105 BPM | WEIGHT: 172.81 LBS | DIASTOLIC BLOOD PRESSURE: 91 MMHG | RESPIRATION RATE: 16 BRPM | BODY MASS INDEX: 25.52 KG/M2 | SYSTOLIC BLOOD PRESSURE: 165 MMHG

## 2025-04-24 DIAGNOSIS — F10.11 ALCOHOL USE DISORDER, MILD, IN EARLY REMISSION: ICD-10-CM

## 2025-04-24 DIAGNOSIS — F41.1 GAD (GENERALIZED ANXIETY DISORDER): Primary | ICD-10-CM

## 2025-04-24 PROCEDURE — 99214 OFFICE O/P EST MOD 30 MIN: CPT | Mod: S$GLB,,,

## 2025-04-24 PROCEDURE — 1159F MED LIST DOCD IN RCRD: CPT | Mod: CPTII,S$GLB,,

## 2025-04-24 PROCEDURE — G2211 COMPLEX E/M VISIT ADD ON: HCPCS | Mod: S$GLB,,,

## 2025-04-24 PROCEDURE — 99999 PR PBB SHADOW E&M-EST. PATIENT-LVL III: CPT | Mod: PBBFAC,,,

## 2025-04-24 PROCEDURE — 3080F DIAST BP >= 90 MM HG: CPT | Mod: CPTII,S$GLB,,

## 2025-04-24 PROCEDURE — 3077F SYST BP >= 140 MM HG: CPT | Mod: CPTII,S$GLB,,

## 2025-04-24 PROCEDURE — 3008F BODY MASS INDEX DOCD: CPT | Mod: CPTII,S$GLB,,

## 2025-04-24 PROCEDURE — 4010F ACE/ARB THERAPY RXD/TAKEN: CPT | Mod: CPTII,S$GLB,,

## 2025-04-24 PROCEDURE — 1160F RVW MEDS BY RX/DR IN RCRD: CPT | Mod: CPTII,S$GLB,,

## 2025-04-24 RX ORDER — FLUOXETINE HYDROCHLORIDE 20 MG/1
20 CAPSULE ORAL DAILY
Qty: 90 CAPSULE | Refills: 3 | Status: SHIPPED | OUTPATIENT
Start: 2025-04-24 | End: 2026-04-19

## 2025-04-24 NOTE — PROGRESS NOTES
"Outpatient Psychiatry Follow-Up Visit   4/24/2025    Clinical Status of Patient:  Outpatient (Ambulatory)    Chief Complaint:  Paula Rodriguez is a 54 y.o. female who presents today for follow-up of depression and anxiety.  Met with patient.      Interval History and Content of Current Session 04/24/2025:  Pt is A+Ox 4.  Patients mood is "good", affect appears congruent and appropriate. Pts thought process is normal and logical.  Pts speech is normal tone, normal rate, normal pitch, normal volume   Linear and logical, friendly and cooperative, normal eye contact, no psychomotor retardation.  Pt is calmly seated in chair during interview.     Patient continues to take Prozac 20 mg daily, Trazodone 50 mg at bedtime, and Klonopin 0.125 to 0.25 mg as needed daily. She reports having taken two pills of Klonopin for panic since the last appointment. Patient discloses that she had been underreporting her alcohol consumption, previously drinking two beers, two mixed drinks, and two glasses of wine per day. However, she abruptly discontinued all alcohol after the last appointment. Since discontinuing alcohol, her mood has drastically improved. Patient has brought at-home blood pressure readings, which appear to be normal. These readings have been scanned into the patient's chart. Patient mentions an upcoming trip to [facility] in May for a conference, expressing excitement about this opportunity. Patient is requesting medication refills during this visit. She denies experiencing withdrawal symptoms after discontinuing alcohol.     Pt reports  taking medications as prescribed and behaving appropriately during interview today.  Denies SI/HI/AVH. Denies side effects of medications.  Pt reports sleeping well and normal appetite.   Denies recreational drug use. Pt reports 0 drinks per week, denies tobacco use, denies Vaping, denies Caffeine.    Standardized Screenings tools:   PHQ9: 2  LANIE- 7: 0    Interim Events: 3/26/2025  Pt is " "A+Ox 4.  Patients mood is "not good", affect appears guarded, sad, anxious. Pts thought process is normal and logical.  Pts speech is normal tone, normal rate, normal pitch, normal volume   Linear and logical, friendly and cooperative, normal eye contact, no psychomotor retardation.  Pt is calmly seated in chair during interview.     Patient states that she has not been doing well since our previous appointment. States that she has been taking celexa 40 MG daily, buspar 15MG BID, Propranolol 10MG TID.  states she stopped naltrexone, after taking it for two months stopped due to being able to cut back on alcohol consumption on her own. Patient states that March makes the anniversary for her 's death. Patient states that she is struggling with anxiety and isolation. Patient works mainly from home, will go into the office occasionally. Patient states that when she goes into the office there is only one other person there. Patient has been reaching out to friends for support. Endorses panic attacks.     Pt reports taking medications as prescribed and behaving appropriately during interview today.  Denies SI/HI/AVH. Denies side effects of medications.  Pt reports sleeping ok and normal appetite.   Denies recreational drug use. Pt has decreased alcohol consumption.  Pt states that she will continue to work on her drinking.  State sthat her goal is to limit drinking socially. Drinks 2 beers a day. CAGE 2/4.    Pt has stopped smoking, denies Vaping, 2 cups of coffee Caffeine.      Standardized Screenings tools:   PHQ9: 6  LANIE- 7: 14      Interim Events: 11/21/2023  Pt is A+Ox 4.  Patients mood is "good", affect appears congruent and appropriate. Pts thought process is normal and logical.  Pts speech is normal tone, normal rate, normal pitch, normal volume   Linear and logical, friendly and cooperative, normal eye contact, no psychomotor retardation.  Pt is calmly seated in chair during interview.     Patient states " "that she has been doing well since our last appointment. Take all medications as prescribed. Endorses an increase in anxiety due to the holidays and a cruise planned in early December. Patient states that she intends to spend the holidays with local friends. Patient came back from [place] a couple weeks ago, states that the trip went well and she is excited to go back. Patient endorses interest in naltrexone, we discussed this medication in the past patient states that she is ready to try it. States that she will start medication after the holidays. Currently patient drinks 5 beers a day. States that her mood is better when she does not drink. Patients requesting refills.    Pt reports taking medications as prescribed and behaving appropriately during interview today.  Denies SI/HI/AVH. Denies side effects of medications.  Pt reports sleeping ok and normal appetite.   Denies recreational drug use. Pt has decreased alcohol consumption.  Pt states that she will continue to work on her drinking.  State sthat her goal is to limit drinking socially. Drinks 5 beers a day. CAGE 2/4.    Pt has stopped smoking, denies Vaping, 2 cups of coffee Caffeine.        Interim Events: 6/14/2023  Pt is A+Ox 4.  Patients mood is "good", affect appears congruent and appropriate. Pts thought process is normal and logical.  Pts speech is normal tone, normal rate, normal pitch, normal volume   Linear and logical, friendly and cooperative, normal eye contact, no psychomotor retardation.  Pt is calmly seated in chair during interview.     Patient states that she has been doing well since our last appointment. Patient states that she got back from [place] one week ago and had a fantastic time visiting family. Patient states that her anxiety and depression are well controlled at this time. Patient states she was seeing Latisha for talk therapy, states that she does not think she needs therapy at this time.  Patient informed she can always " "return if needed.  Patient states that she took up a yoga class, states that she feels better when exercising/staying active.  Patient states that she started up her garden again, excited about growing okra.  Patient appears to be well, requesting refills    Reports depression today as 1/10, and anxiety as 2/10.  Pt reports taking medications as prescribed and behaving appropriately during interview today.  Denies SI/HI/AVH. Denies side effects of medications.  Pt reports sleeping well and normal appetite.     Denies recreational drug use. Pt has decreased alcohol consumption.  Pt states that she will continue to work on her drinking.  State sthat her goal is to limit drinking socially.  Pt has stopped smoking, denies Vaping, 2 cups of coffee Caffeine.  CAGE 2/4.      Interim Events: 5/3/2023  Pt is A+Ox 4.  Patients mood is "better", affect appears congruent and appropriate. Pts thought process is normal and logical.  Pts speech is normal tone, normal rate, normal pitch, normal volume   Linear and logical, friendly and cooperative, good eye contact, no psychomotor retardation.  Pt is calmly seated in chair during interview.     Patient states that she's been doing better since previous appointment. Patient states that her anxiety and depression are more controlled. Patient is more active previously, spending time in her garden every day. Patient went to [place] and spread her  's ashes ocean with family nearby. Patient states that she was crying happy tears.  patient will be visiting family in [place] in may. Patient states that she is excited to see them.     Reports depression today as 3/10, and anxiety as 3/10.  Pt reports taking medications as prescribed and behaving appropriately during interview today.  Denies SI/HI/AVH. Denies side effects of medications.  Pt reports sleeping well and normal appetite.     Denies recreational drug use. Pt has decreased alcohol consumption.  Pt states that she will " "continue to work on her drinking.  Pt reports 25 drinks per week.  Pt has stopped smoking, denies Vaping, 2 cups of coffee Caffeine.  CAGE 2/4.        Interim Events: 4/3/2023    Pt is A+Ox 4.  Patients mood is "ok", affect appears blunted, sad. Pts thought process is normal and logical.  Pts speech is normal tone, normal rate, normal pitch, normal volume   Linear and logical, friendly and cooperative, poor eye contact, no psychomotor retardation.  Pt is tearful during interview.     Patient states that she has been doing OK since her last appointment. Patient states that her anniversary was two weeks ago ,states that since then her mood has been low. Patient continued to endorse heavy drinking, 5 drinks per night. Patient states that she understands her drinking is becoming an issue, currently is not interested in medications that can cut cravings. Patient encouraged to decrease alcohol consumption as this affects mood. Patient states that she is planning on releasing her late 's ashes Mid-May 2023 in [place].     Reports depression today as 4/10, and anxiety as 5/10.  Pt reports taking medications as prescribed and behaving appropriately during interview today.  Denies SI/HI/AVH. Denies side effects of medications.  Pt reports sleeping well and normal appetite.     Denies recreational drug use. Pt reports 35 drinks per week, 0.5ppd tobacco use, denies Vaping, 2 cups of coffee Caffeine.  CAGE 2/4.          Interim events: 03/02/2023  Pt is A+Ox 4.  Patients mood is "better", affect appears appropriate, sad. Pts thought process is normal and logical.  Pts speech is normal tone, normal rate, normal pitch, normal volume   Linear and logical, friendly and cooperative, good eye contact, no psychomotor retardation.  Pt is calmly seated in chair during interview. Pt is casually dressed and well groomed.  Pt is not tearful during interview    Patient states that her anxiety and depression improved since starting " "Celexa, patient is currently taking Celexa 10mg daily. Pt states that she has cut back on her drinking, currently reports drinking 14 beers a week. Patient states that she has stopped drinking hard liquor. During the last two weeks Pt reports hanging out with friends which has help distract her. Patient also states that she has begun gardening again.    Reports depression today as 4/10, and anxiety as 2/10.    Pt reports taking medications as prescribed and behaving appropriately during interview today.  Denies SI/HI/AVH. Denies side effects of medications.  Pt reports sleeping well and normal appetite.     Denies recreational drug use. Pt reports 14 drinks per week, 0.5ppd tobacco use, denies Vaping, 2 cups of coffee Caffeine.  CAGE 2/.        Prior visit :  Psych Interview 2023:   Paula Rodriguez is a 52 y.o. female with past psychiatric history of depression  presented to for initial evaluation and treatment for depression and anxiety.     Pt is A+Ox 4.  Patients mood is "not good", affect appears guarded, sad, anxious. Pts thought process is normal and logical.  Pts speech is normal tone, normal rate, normal pitch, normal volume   Linear and logical, cooperative, poor eye contact, psychomotor retardation.  Pt is tearful during interview.  Pt is casually dressed and well groomed.       States that she has been taking lexapro for 15 years for depression.  States that she was well controlled until her  unexpectedly  in 2022.  States that she has not been able to grieve his passing.  States that she is from [place] and all of her family lives there.  States that she does have local friends who she can talk to.  States that she has looked into group therapy, but was unsuccessful.  States that she is interested in talk therapy at this time.  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  States that she enjoys her work but works 3 days a week from home.  States that " being home is not good for her mental health and she has been going on walks with her dog to get out the house.       Denies prior hx of psychiatric hospitalizations. Denies hx of suicide attempts. Pt denies hx self harm. Pt denies hx hallucinations.  Pt denies hx of eating disorders.   Pt denies hx trauma. Denies physical/sexual abuse. Pt denies symptoms including nightmares, hypervigilance, flashbacks, avoidance behaviors, and disassociation.     Reports depression today as 6/10, and anxiety as 4/10.  Reports sleeping 8 hrs per night, and poor appetite. States that she consumes 5 drinks every night to go to sleep.  State sthat she wants to stop drinks.  States that she has used drinking to cope with her husbands passing.  Lost 20lbs over this last year.    Denies SI/HI/AVH. Denies side effects of medications.     Pt states that there support consists of friends and coworkers.       Denies recreational drug use. Pt reports 35 drinks per week, 0.5ppd tobacco use, denies Vaping, 2 cups of coffee Caffeine.  CAGE 2/4.       Current Medication:  Lexapro 20mg daily  Ambien 10mg nightly        DX:  The patient complained of depressed mood with lethargy, decreased appetite , insomnia, psycho-motor retardation, anhedonia, apathy, worsening self-esteem, guilt, decreased concentration and ability to make decisions, and thoughts of suicide.      Pt denies hx symptoms/episodes of cathleen.     Admits to symptoms of anxiety including excessive anxiety/worry/fear, more days than not, about numerous issues, difficulty controlling the worry, over thinking, rumination, restlessness, poor concentration, fatigue, and increased irritability. Denies panic attacks at this time.     Past Psychiatric History:   Previous Psychiatric Hospitalizations: NO  Previous Medication Trials: YES:      History of psychotherapy:  NO  Previous Suicide Attempts: NO  History of Violence:  NO  History of physical/sexual abuse: NO  Outpatient psychiatric  provider(past): NO     Substance Abuse History:   Tobacco: NO  Alcohol: YES:      Illicit Substances: NO  Detox/Rehab: NO     Neurological History:   Seizures: NO  Head trauma: NO     Family Psychiatric History: Yes -   Father - depression     Social History:  Developmental/Childhood:Achieved all developmental milestones timely  *Education:Bachelor's Degree  Employment Status/Finances:Employed   Relationship Status/Sexual Orientation:    Children: 0  Housing Status: Home    history:  NO  Access to gun: YES:      Episcopalian: agnostic  Recreational activities: gardening   Person patient is closest to/confides in: friends     Legal History:   Past Charges/Incarcerations:  No      Review of Systems     Review of Systems   Constitutional:  Negative for weight loss.   HENT:  Negative for tinnitus.    Eyes:  Negative for blurred vision.   Respiratory:  Negative for cough and shortness of breath.    Cardiovascular:  Negative for chest pain.   Gastrointestinal:  Negative for abdominal pain.   Genitourinary:  Negative for dysuria.   Musculoskeletal:  Negative for back pain and neck pain.   Skin:  Negative for rash.   Neurological:  Negative for dizziness, seizures and weakness.   Psychiatric/Behavioral:  Positive for depression. Negative for hallucinations, memory loss, substance abuse and suicidal ideas. The patient is nervous/anxious. The patient does not have insomnia.        Past Medical, Family and Social History: The patient's past medical, family and social history have been reviewed and updated as appropriate within the electronic medical record - see encounter notes.      Current Medications:   Medication List with Changes/Refills   Current Medications    CLONAZEPAM (KLONOPIN) 0.25 MG TBDL    Take 1 tablet (0.25 mg total) by mouth once daily.    HYDROCHLOROTHIAZIDE (HYDRODIURIL) 25 MG TABLET    Take 1 tablet (25 mg total) by mouth once daily.    TRAZODONE (DESYREL) 50 MG TABLET    Take 1 tablet (50 mg  "total) by mouth every evening.    VALSARTAN (DIOVAN) 320 MG TABLET    Take 1 tablet (320 mg total) by mouth once daily.   Changed and/or Refilled Medications    Modified Medication Previous Medication    FLUOXETINE 20 MG CAPSULE FLUoxetine 20 MG capsule       Take 1 capsule (20 mg total) by mouth once daily.    Take 1 capsule (20 mg total) by mouth once daily.         Allergies:   Review of patient's allergies indicates:  No Known Allergies      Vitals   Vitals:    04/24/25 0821   BP: (!) 165/91   Pulse: 105   Resp: 16       Labs/Imaging/Studies:   No results found for this or any previous visit (from the past 48 hours).     No results found for: "PHENYTOIN", "PHENOBARB", "VALPROATE", "CBMZ"    Compliance: yes    Side effects: None    Risk Parameters:  Patient reports no suicidal ideation  Patient reports no homicidal ideation  Patient reports no self-injurious behavior  Patient reports no violent behavior    Exam (detailed: at least 9 elements; comprehensive: all 15 elements)   Constitutional  Vitals:  Most recent vital signs, dated less than 90 days prior to this appointment, were reviewed.   Vitals:    04/24/25 0821   BP: (!) 165/91   Pulse: 105   Resp: 16   Weight: 78.4 kg (172 lb 13.5 oz)          General:  unremarkable, age appropriate, casually dressed, neatly groomed     Musculoskeletal  Muscle Strength/Tone:  no spasicity, no rigidity, no cogwheeling, no flaccidity, no paratonia, no dyskinesia, no dystonia, no tremor, no tic, no choreoathetosis, no atrophy   Gait & Station:  non-ataxic     Psychiatric  Speech:  no latency; no press   Mood & Affect:  steady  congruent and appropriate   Thought Process:  normal and logical   Associations:  intact   Thought Content:  normal, no suicidality, no homicidality, delusions, or paranoia   Insight:  intact, has awareness of illness   Judgement: behavior is adequate to circumstances, age appropriate   Orientation:  grossly intact, person, place, situation, time/date " "  Memory: intact for content of interview, grossly intact, memory >3 objects at five mins   Language: grossly intact, able to name, able to repeat   Attention Span & Concentration:  able to focus, completed tasks   Fund of Knowledge:  intact and appropriate to age and level of education, familiar with aspects of current personal life     Assessment and Diagnosis   Status/Progress: Based on the examination today, the patient's problem(s) is/are resolving.  New problems have not been presented today.       General Impression:      ICD-10-CM ICD-9-CM   1. LANIE (generalized anxiety disorder)  F41.1 300.02   2. Alcohol use disorder, mild, in early remission  F10.11 305.03       Intervention/Counseling/Treatment Plan   Mood  Continue Prozac 20mg daily - targeting anxiety and depression   Continue Trazodone 50mg nightly- targeting sleep.   Continue klonopin 0.25mg daily PRN panic.   -  Reviewed, Pt is in compliance    Will hold Naltrexone at this time. Pt has cut back on alcohol on her own.     Pt was extensively educated in the importance of making and keeping a talk therapy appointment.       Pt BP is always elevated in clinic.  States that this is due to "Nerves"  In clinic Pt denies HA, SOB, Leg swelling, leg pain, CP, changes in vision     Discussed diagnosis, risks and benefits of proposed treatment above vs alternative treatments vs no treatment, and potential side effects of these treatments, and the inherent unpredictability of individual response to treatment.  The patient expresses understanding and gives informed consent to pursue treatment.  The potential benefits outweigh the potential risks. Patient has no other questions. Risks/adverse effects discussed at this time including but not limited to: GI side effects, sexual dysfunction, activation vs sedation, triggering of suicidal thoughts, and serotonin syndrome.   Discussed with patient, the potential adverse effects of Benzodiazepines, including, but not " limited to, drowsiness, dizziness, risk of falls, and abuse potential. Counseled patient on avoiding alcohol, while using this medication, due to the risk of respiratory depression. Patient instructed not to operate any heavy machinery or drive a vehicle while on this medication. Informed patient of the risks of continuous benzodiazepine use, including tolerance, dependence and withdrawals that may be life threatening, upon abrupt cessation. Also advised patient not to take benzodiazepines with opiates and/or other sedatives. The patient expresses an understanding of the above as well as the inherent unpredictability of said treatment.  The patient agrees that, currently, the benefits outweigh the risks, and would like to pursue said treatment at this time.      Serotonin syndrome   Mental status changes can include anxiety, restlessness, disorientation, and agitated delirium.    Autonomic manifestations can include diaphoresis, tachycardia, hyperthermia, hypertension, vomiting, and diarrhea   Neuromuscular hyperactivity can manifest as tremor, myoclonus, hyperreflexia, rigidity, hyperthermia, seizure, and bilateral Babinski sign.   Pt was informed that if they experience any of these symptoms to go the ED.     Difficulty Sleeping Behavioral Modification:  Implement stimulus control: [place] bedroom for sleep only. Leave bedroom when frustrated from not sleeping. Engage in relaxation before returning. Engage in activities during the day. AVOID >7-8 h time in bed  Avoid clock watching  Avoid thinking/worrying about sleep when trying to fall asleep  Limit caffeinated consumption  Make sure the bedroom is dark, quiet and cool    Safety Plan   Patient voices understanding and agreement with this plan  Provided crisis numbers  Encouraged patient to keep future appointments.  Instructed patient to call or message with questions or concerns  In the event of an emergency, including suicidal ideation, patient was advised to  go to the emergency room and/or call 911    Return to Clinic: 6 months      Psychotherapy:  Target symptoms: depression, anxiety   Why chosen therapy is appropriate versus another modality: relevant to diagnosis, evidence based practice  Outcome monitoring methods: self-report, observation  Therapeutic intervention type: insight oriented psychotherapy, supportive psychotherapy  Topics discussed/themes: illness/death of a loved one, difficulty managing affect in interpersonal relationships, building skills sets for symptom management, symptom recognition  The patient's response to the intervention is accepting. The patient's progress toward treatment goals is good.   Duration of intervention: 15 minutes.    Total face to face time: 30 min  Total time (chart review, patient contact, documentation): 35 min    A diagnostic psychiatric evaluation was performed and responsiveness to treatment was assessed.  The patient demonstrates robust ability/capacity to respond to treatment.    Eron Amaya PA-C    *This note has been prepared using a combination of a dictation device and typing.  It has been checked for errors but some errors may still exist within the note as a result of speech recognition errors and/or typographical errors.

## 2025-06-06 ENCOUNTER — PATIENT MESSAGE (OUTPATIENT)
Dept: PSYCHIATRY | Facility: CLINIC | Age: 55
End: 2025-06-06
Payer: COMMERCIAL

## 2025-06-09 ENCOUNTER — PATIENT MESSAGE (OUTPATIENT)
Dept: ADMINISTRATIVE | Facility: HOSPITAL | Age: 55
End: 2025-06-09
Payer: COMMERCIAL

## 2025-06-09 RX ORDER — FLUOXETINE HYDROCHLORIDE 40 MG/1
40 CAPSULE ORAL DAILY
Qty: 90 CAPSULE | Refills: 0 | Status: SHIPPED | OUTPATIENT
Start: 2025-06-09 | End: 2025-09-07

## 2025-06-25 ENCOUNTER — PATIENT MESSAGE (OUTPATIENT)
Dept: PSYCHIATRY | Facility: CLINIC | Age: 55
End: 2025-06-25
Payer: COMMERCIAL

## 2025-07-23 ENCOUNTER — OFFICE VISIT (OUTPATIENT)
Dept: FAMILY MEDICINE | Facility: CLINIC | Age: 55
End: 2025-07-23
Payer: COMMERCIAL

## 2025-07-23 VITALS
WEIGHT: 169.31 LBS | TEMPERATURE: 99 F | OXYGEN SATURATION: 99 % | HEART RATE: 98 BPM | HEIGHT: 69 IN | SYSTOLIC BLOOD PRESSURE: 128 MMHG | BODY MASS INDEX: 25.08 KG/M2 | DIASTOLIC BLOOD PRESSURE: 76 MMHG

## 2025-07-23 DIAGNOSIS — F41.1 GAD (GENERALIZED ANXIETY DISORDER): ICD-10-CM

## 2025-07-23 DIAGNOSIS — Z12.31 ENCOUNTER FOR SCREENING MAMMOGRAM FOR BREAST CANCER: ICD-10-CM

## 2025-07-23 DIAGNOSIS — F39 MOOD DISORDER: ICD-10-CM

## 2025-07-23 DIAGNOSIS — K76.0 FATTY LIVER: ICD-10-CM

## 2025-07-23 DIAGNOSIS — G47.00 INSOMNIA, UNSPECIFIED TYPE: ICD-10-CM

## 2025-07-23 DIAGNOSIS — F41.9 ANXIETY DISORDER, UNSPECIFIED TYPE: ICD-10-CM

## 2025-07-23 DIAGNOSIS — Z00.00 ROUTINE MEDICAL EXAM: Primary | ICD-10-CM

## 2025-07-23 DIAGNOSIS — Z12.11 SCREENING FOR COLORECTAL CANCER: ICD-10-CM

## 2025-07-23 DIAGNOSIS — D75.89 MACROCYTOSIS: ICD-10-CM

## 2025-07-23 DIAGNOSIS — I10 ESSENTIAL HYPERTENSION: ICD-10-CM

## 2025-07-23 DIAGNOSIS — Z12.12 SCREENING FOR COLORECTAL CANCER: ICD-10-CM

## 2025-07-23 PROCEDURE — 4010F ACE/ARB THERAPY RXD/TAKEN: CPT | Mod: CPTII,S$GLB,, | Performed by: INTERNAL MEDICINE

## 2025-07-23 PROCEDURE — 3078F DIAST BP <80 MM HG: CPT | Mod: CPTII,S$GLB,, | Performed by: INTERNAL MEDICINE

## 2025-07-23 PROCEDURE — 3074F SYST BP LT 130 MM HG: CPT | Mod: CPTII,S$GLB,, | Performed by: INTERNAL MEDICINE

## 2025-07-23 PROCEDURE — 99999 PR PBB SHADOW E&M-EST. PATIENT-LVL III: CPT | Mod: PBBFAC,,, | Performed by: INTERNAL MEDICINE

## 2025-07-23 PROCEDURE — 3008F BODY MASS INDEX DOCD: CPT | Mod: CPTII,S$GLB,, | Performed by: INTERNAL MEDICINE

## 2025-07-23 PROCEDURE — 1159F MED LIST DOCD IN RCRD: CPT | Mod: CPTII,S$GLB,, | Performed by: INTERNAL MEDICINE

## 2025-07-23 PROCEDURE — 99396 PREV VISIT EST AGE 40-64: CPT | Mod: S$GLB,,, | Performed by: INTERNAL MEDICINE

## 2025-07-23 RX ORDER — HYDROCHLOROTHIAZIDE 25 MG/1
25 TABLET ORAL DAILY
Qty: 90 TABLET | Refills: 3 | Status: SHIPPED | OUTPATIENT
Start: 2025-07-23 | End: 2026-07-23

## 2025-07-23 RX ORDER — VALACYCLOVIR HYDROCHLORIDE 500 MG/1
1 TABLET, FILM COATED ORAL 2 TIMES DAILY
COMMUNITY
Start: 2024-09-20 | End: 2025-09-20

## 2025-07-23 RX ORDER — CLONAZEPAM 0.25 MG/1
0.25 TABLET, ORALLY DISINTEGRATING ORAL DAILY
Qty: 30 TABLET | Refills: 3 | Status: SHIPPED | OUTPATIENT
Start: 2025-07-23 | End: 2025-08-22

## 2025-07-23 NOTE — PROGRESS NOTES
Chief complaint physical    55-year-old white female still active .  She was working for home due to the pandemic.  Her ' retired and then committed suicide.   At the end of the day it is hard for her to wind down.  She does have a couple of drinks to help her relax.  She then has trouble falling asleep but not related to the alcohol.  She tolerated Ambien in the past without any problems.  We discussed other potential side effects of Ambien.  She has had no withdrawal the next day.  She will use it with caution if drinking but never had any issues in the past.  She does feel anxiety is fairly under control and not interfering with function on the Lexapro 20 and now appears she is on Celexa which was increase to 30 mg but then 40 mg a year ago, now trying prozac. ..  .  Primary issue with Ambien was limited covered by insurance.  She did see psychiatrist looks like she now on trazodone prn.     Some stress and blood pressure was up but usually runs better at home.  Whenever stressed it runs higher.  She is working and that is somewhat stressful but last year went to [place] and that is stressful.  She is now seeing Psychiatry and takes the Inderal stopped since not helping and put on klonopin.  Buspar no help.    Labs reviewed .  Looks like ALT has been up since about 2018 which probably correlates with increase alcohol and weight gain related to stress and so forth.  She is working on that.  We discussed that if ALT remains elevated for many many years no might eventually get her to the hepatology Clinic to assess for underlying cirrhosis    She did actually have a normal colonoscopy with Dr. Bansal 2021.  She was told come back in 10 years    Mammo 6/24 -set tomorrow.  [facility] but would like to switch over to [facility].     she is walking her dog every day.      Blood pressure running well at home but with it rising often during stress, we discussed an overall increase in her  hydrochlorothiazide from 12.5-25 mg.  In the future we could also possibly try to get her to take her propranolol on a more regular rather than p.r.n. basis and that could help as well.      .  MCV was slightly up.  B12 ok,  may represent the alcohol intake.    CONST: weight stable. EYES: no vision change. ENT: no sore throat. CV: no chest pain w/ exertion. RESP: no shortness of breath. GI: no nausea, vomiting, diarrhea. No dysphagia. : no urinary issues. MUSCULOSKELETAL: no new myalgias or arthralgias. SKIN: no new changes. NEURO: no focal deficits. PSYCH: no new issues. ENDOCRINE: no polyuria. HEME: no lymph nodes. ALLERGY: no general pruritis.     PAST MEDICAL HISTORY:    1. Anxiety/insomnia -Lexapro, occasional use of Ambien  2.  Hypertension formally diagnosed   3. [facility] per [facility].   4.  Nail dermatophytosis    5.  Isolated ALT elevation resolved - fatty liver    6. Normal colonoscopy 2021, 10 years, Dr. Bansal                                                                                                                      PAST SURGICAL HISTORY:  None.                                                                                                                             SOCIAL HISTORY:  . Former smoker.  Quit in  and smoked for 10 years        prior.  She drinks alcohol on the weekends.                                                                                                               FAMILY HISTORY:  Mom  with no problems.  Father  last year at 76    of lung cancer and was a nonsmoker but was a book keeper in [place].    One sister in 40s in good health.        Gen: no distress  EYES: conjunctiva clear, non-icteric, PERRL  ENT: nose clear, nasal mucosa normal, oropharynx clear and moist, teeth good  NECK:supple, thyroid non-palpable  RESP: effort is good, lungs clear  CV: heart RRR w/o murmur, gallops or rubs; no carotid bruits, no edema  GI:  abdomen soft, non-distended, non-tender, no hepatosplenomegaly  MS: gait normal, no clubbing or cyanosis of the digits  SKIN: no rashes, warm to touch       Diagnoses and all orders for this visit:    Routine medical exam, up-to-date on cancer screening  -     Comprehensive Metabolic Panel; Future  -     Hemoglobin A1C; Future  -     CBC Auto Differential; Future  -     TSH; Future  -     T4, Free; Future  -     Lipid Panel; Future    Encounter for screening mammogram for breast cancer, scheduled    Screening for colorectal cancer    Essential hypertension, chronic and stable  -     hydroCHLOROthiazide (HYDRODIURIL) 25 MG tablet; Take 1 tablet (25 mg total) by mouth once daily.    Anxiety disorder, unspecified type, now on Prozac, BuSpar not much help.  Given clonazepam to use on occasion which she says his effective.  30 pills lasted about six-months.  She requests refill  -     TSH; Future  -     T4, Free; Future    Mood disorder since psychiatrist is out on vacation apparently.    Insomnia, unspecified type, chronic and stable, occasional use of meds trazodone I believe,    Macrocytosis  -     CBC Auto Differential; Future    Fatty liver  -     Comprehensive Metabolic Panel; Future  -     Hemoglobin A1C; Future    LANIE (generalized anxiety disorder)  -     clonazePAM (KLONOPIN) 0.25 MG TbDL; Take 1 tablet (0.25 mg total) by mouth once daily.

## 2025-07-24 ENCOUNTER — HOSPITAL ENCOUNTER (OUTPATIENT)
Dept: RADIOLOGY | Facility: HOSPITAL | Age: 55
Discharge: HOME | End: 2025-07-24
Attending: INTERNAL MEDICINE
Payer: COMMERCIAL

## 2025-07-24 DIAGNOSIS — Z12.31 ENCOUNTER FOR SCREENING MAMMOGRAM FOR BREAST CANCER: ICD-10-CM

## 2025-07-24 PROCEDURE — 77067 SCR MAMMO BI INCL CAD: CPT | Mod: TC,PO

## 2025-07-29 ENCOUNTER — RESULTS FOLLOW-UP (OUTPATIENT)
Dept: FAMILY MEDICINE | Facility: CLINIC | Age: 55
End: 2025-07-29
Payer: COMMERCIAL

## 2025-07-29 DIAGNOSIS — F41.1 GAD (GENERALIZED ANXIETY DISORDER): ICD-10-CM

## 2025-07-31 RX ORDER — CLONAZEPAM 0.5 MG/1
TABLET, ORALLY DISINTEGRATING ORAL
Qty: 30 TABLET | Refills: 3 | Status: SHIPPED | OUTPATIENT
Start: 2025-07-31

## 2025-07-31 NOTE — TELEPHONE ENCOUNTER
Please advise,    Thank you, I will. Nobody has the klonazepam available, it is out of stock. [facility] says it won't be in stock until October, [facility] and [facility] don't have it either. Can you prescribe something similar?

## 2025-09-04 RX ORDER — VALSARTAN 320 MG/1
320 TABLET ORAL DAILY
Qty: 90 TABLET | Refills: 3 | Status: SHIPPED | OUTPATIENT
Start: 2025-09-04